# Patient Record
Sex: FEMALE | Race: BLACK OR AFRICAN AMERICAN | Employment: PART TIME | URBAN - METROPOLITAN AREA
[De-identification: names, ages, dates, MRNs, and addresses within clinical notes are randomized per-mention and may not be internally consistent; named-entity substitution may affect disease eponyms.]

---
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
## 2023-02-02 ENCOUNTER — HOSPITAL ENCOUNTER (INPATIENT)
Age: 18
LOS: 1 days | Discharge: HOME | DRG: 561 | End: 2023-02-04
Attending: STUDENT IN AN ORGANIZED HEALTH CARE EDUCATION/TRAINING PROGRAM | Admitting: OBSTETRICS & GYNECOLOGY
Payer: COMMERCIAL

## 2023-02-02 DIAGNOSIS — A41.9 SEPTICEMIA (HCC): Primary | ICD-10-CM

## 2023-02-02 PROCEDURE — 96367 TX/PROPH/DG ADDL SEQ IV INF: CPT

## 2023-02-02 PROCEDURE — 93005 ELECTROCARDIOGRAM TRACING: CPT | Performed by: STUDENT IN AN ORGANIZED HEALTH CARE EDUCATION/TRAINING PROGRAM

## 2023-02-02 PROCEDURE — 96365 THER/PROPH/DIAG IV INF INIT: CPT

## 2023-02-02 PROCEDURE — 96361 HYDRATE IV INFUSION ADD-ON: CPT

## 2023-02-02 PROCEDURE — 86900 BLOOD TYPING SEROLOGIC ABO: CPT

## 2023-02-02 PROCEDURE — 80053 COMPREHEN METABOLIC PANEL: CPT

## 2023-02-02 PROCEDURE — 36415 COLL VENOUS BLD VENIPUNCTURE: CPT

## 2023-02-02 PROCEDURE — 86850 RBC ANTIBODY SCREEN: CPT

## 2023-02-02 PROCEDURE — 99285 EMERGENCY DEPT VISIT HI MDM: CPT

## 2023-02-02 PROCEDURE — 84145 PROCALCITONIN (PCT): CPT

## 2023-02-02 PROCEDURE — 83735 ASSAY OF MAGNESIUM: CPT

## 2023-02-02 PROCEDURE — 85025 COMPLETE CBC W/AUTO DIFF WBC: CPT

## 2023-02-02 PROCEDURE — 86901 BLOOD TYPING SEROLOGIC RH(D): CPT

## 2023-02-02 PROCEDURE — 84484 ASSAY OF TROPONIN QUANT: CPT

## 2023-02-02 PROCEDURE — 2580000003 HC RX 258: Performed by: STUDENT IN AN ORGANIZED HEALTH CARE EDUCATION/TRAINING PROGRAM

## 2023-02-02 RX ADMIN — SODIUM CHLORIDE 1779 ML: 9 INJECTION, SOLUTION INTRAVENOUS at 23:54

## 2023-02-02 ASSESSMENT — PAIN DESCRIPTION - LOCATION: LOCATION: ABDOMEN

## 2023-02-02 ASSESSMENT — PAIN SCALES - GENERAL: PAINLEVEL_OUTOF10: 6

## 2023-02-02 ASSESSMENT — PAIN - FUNCTIONAL ASSESSMENT: PAIN_FUNCTIONAL_ASSESSMENT: 0-10

## 2023-02-03 ENCOUNTER — APPOINTMENT (OUTPATIENT)
Dept: CT IMAGING | Age: 18
DRG: 561 | End: 2023-02-03
Payer: COMMERCIAL

## 2023-02-03 ENCOUNTER — APPOINTMENT (OUTPATIENT)
Dept: GENERAL RADIOLOGY | Age: 18
DRG: 561 | End: 2023-02-03
Payer: COMMERCIAL

## 2023-02-03 LAB
ABO/RH: NORMAL
ALBUMIN SERPL-MCNC: 3.5 G/DL (ref 3.5–4.6)
ALP BLD-CCNC: 129 U/L (ref 40–130)
ALT SERPL-CCNC: 21 U/L (ref 0–33)
ANION GAP SERPL CALCULATED.3IONS-SCNC: 14 MEQ/L (ref 9–15)
ANISOCYTOSIS: ABNORMAL
ANTIBODY SCREEN: NORMAL
APTT: 34.7 SEC (ref 24.4–36.8)
AST SERPL-CCNC: 11 U/L (ref 0–35)
ATYPICAL LYMPHOCYTE RELATIVE PERCENT: 8 %
BACTERIA: NEGATIVE /HPF
BASOPHILS ABSOLUTE: 0 K/UL (ref 0–0.2)
BASOPHILS RELATIVE PERCENT: 0.4 %
BILIRUB SERPL-MCNC: 0.4 MG/DL (ref 0.2–0.7)
BILIRUBIN URINE: NEGATIVE
BLOOD, URINE: ABNORMAL
BUN BLDV-MCNC: 12 MG/DL (ref 6–20)
CALCIUM SERPL-MCNC: 8.8 MG/DL (ref 8.5–9.9)
CHLORIDE BLD-SCNC: 102 MEQ/L (ref 95–107)
CLARITY: CLEAR
CO2: 20 MEQ/L (ref 20–31)
COLOR: YELLOW
CREAT SERPL-MCNC: 0.77 MG/DL (ref 0.5–0.9)
EKG ATRIAL RATE: 93 BPM
EKG P AXIS: 69 DEGREES
EKG P-R INTERVAL: 132 MS
EKG Q-T INTERVAL: 350 MS
EKG QRS DURATION: 94 MS
EKG QTC CALCULATION (BAZETT): 435 MS
EKG R AXIS: 56 DEGREES
EKG T AXIS: 4 DEGREES
EKG VENTRICULAR RATE: 93 BPM
EOSINOPHILS ABSOLUTE: 0 K/UL (ref 0–0.7)
EOSINOPHILS RELATIVE PERCENT: 1.5 %
EPITHELIAL CELLS, UA: ABNORMAL /HPF (ref 0–5)
GFR SERPL CREATININE-BSD FRML MDRD: >60 ML/MIN/{1.73_M2}
GLOBULIN: 3.5 G/DL (ref 2.3–3.5)
GLUCOSE BLD-MCNC: 103 MG/DL (ref 70–99)
GLUCOSE URINE: NEGATIVE MG/DL
HCT VFR BLD CALC: 35.2 % (ref 37–47)
HEMOGLOBIN: 11.3 G/DL (ref 12–16)
HYALINE CASTS: ABNORMAL /HPF (ref 0–5)
INFLUENZA A BY PCR: NEGATIVE
INFLUENZA B BY PCR: NEGATIVE
INR BLD: 1.1
KETONES, URINE: NEGATIVE MG/DL
LACTIC ACID, SEPSIS: 1.6 MMOL/L (ref 0.5–1.9)
LEUKOCYTE ESTERASE, URINE: ABNORMAL
LYMPHOCYTES ABSOLUTE: 2.4 K/UL (ref 1–4.8)
LYMPHOCYTES RELATIVE PERCENT: 8 %
MAGNESIUM: 2 MG/DL (ref 1.7–2.2)
MCH RBC QN AUTO: 28 PG (ref 27–31.3)
MCHC RBC AUTO-ENTMCNC: 32.1 % (ref 33–37)
MCV RBC AUTO: 87.3 FL (ref 79.4–94.8)
METAMYELOCYTES RELATIVE PERCENT: 1 %
MONOCYTES ABSOLUTE: 0.6 K/UL (ref 0.2–0.8)
MONOCYTES RELATIVE PERCENT: 3.8 %
NEUTROPHILS ABSOLUTE: 12 K/UL (ref 1.4–6.5)
NEUTROPHILS RELATIVE PERCENT: 79 %
NITRITE, URINE: NEGATIVE
PDW BLD-RTO: 13.9 % (ref 11.5–14.5)
PH UA: 5.5 (ref 5–9)
PLATELET # BLD: 405 K/UL (ref 130–400)
PLATELET SLIDE REVIEW: NORMAL
POC CREATININE WHOLE BLOOD: 0.77
POTASSIUM SERPL-SCNC: 3.2 MEQ/L (ref 3.4–4.9)
PROCALCITONIN: 0.18 NG/ML (ref 0–0.15)
PROTEIN UA: NEGATIVE MG/DL
PROTHROMBIN TIME: 14.7 SEC (ref 12.3–14.9)
RBC # BLD: 4.03 M/UL (ref 4.2–5.4)
RBC UA: ABNORMAL /HPF (ref 0–2)
SARS-COV-2, NAAT: NOT DETECTED
SODIUM BLD-SCNC: 136 MEQ/L (ref 135–144)
SPECIFIC GRAVITY UA: 1.03 (ref 1–1.03)
TOTAL PROTEIN: 7 G/DL (ref 6.3–8)
TROPONIN: <0.01 NG/ML (ref 0–0.01)
URINE REFLEX TO CULTURE: YES
UROBILINOGEN, URINE: 1 E.U./DL
WBC # BLD: 15 K/UL (ref 4.5–11)
WBC UA: >100 /HPF (ref 0–5)

## 2023-02-03 PROCEDURE — 74177 CT ABD & PELVIS W/CONTRAST: CPT

## 2023-02-03 PROCEDURE — 87635 SARS-COV-2 COVID-19 AMP PRB: CPT

## 2023-02-03 PROCEDURE — 87086 URINE CULTURE/COLONY COUNT: CPT

## 2023-02-03 PROCEDURE — 36415 COLL VENOUS BLD VENIPUNCTURE: CPT

## 2023-02-03 PROCEDURE — 71045 X-RAY EXAM CHEST 1 VIEW: CPT

## 2023-02-03 PROCEDURE — 2580000003 HC RX 258: Performed by: OBSTETRICS & GYNECOLOGY

## 2023-02-03 PROCEDURE — 87502 INFLUENZA DNA AMP PROBE: CPT

## 2023-02-03 PROCEDURE — 99239 HOSP IP/OBS DSCHRG MGMT >30: CPT | Performed by: OBSTETRICS & GYNECOLOGY

## 2023-02-03 PROCEDURE — 85730 THROMBOPLASTIN TIME PARTIAL: CPT

## 2023-02-03 PROCEDURE — 87186 SC STD MICRODIL/AGAR DIL: CPT

## 2023-02-03 PROCEDURE — 87040 BLOOD CULTURE FOR BACTERIA: CPT

## 2023-02-03 PROCEDURE — 85610 PROTHROMBIN TIME: CPT

## 2023-02-03 PROCEDURE — 6360000002 HC RX W HCPCS: Performed by: OBSTETRICS & GYNECOLOGY

## 2023-02-03 PROCEDURE — 2580000003 HC RX 258: Performed by: STUDENT IN AN ORGANIZED HEALTH CARE EDUCATION/TRAINING PROGRAM

## 2023-02-03 PROCEDURE — 93010 ELECTROCARDIOGRAM REPORT: CPT | Performed by: INTERNAL MEDICINE

## 2023-02-03 PROCEDURE — 6360000002 HC RX W HCPCS: Performed by: STUDENT IN AN ORGANIZED HEALTH CARE EDUCATION/TRAINING PROGRAM

## 2023-02-03 PROCEDURE — 1210000000 HC MED SURG R&B

## 2023-02-03 PROCEDURE — 86403 PARTICLE AGGLUT ANTBDY SCRN: CPT

## 2023-02-03 PROCEDURE — 81001 URINALYSIS AUTO W/SCOPE: CPT

## 2023-02-03 PROCEDURE — 6370000000 HC RX 637 (ALT 250 FOR IP): Performed by: OBSTETRICS & GYNECOLOGY

## 2023-02-03 PROCEDURE — 2580000003 HC RX 258

## 2023-02-03 PROCEDURE — 83605 ASSAY OF LACTIC ACID: CPT

## 2023-02-03 PROCEDURE — 6360000004 HC RX CONTRAST MEDICATION

## 2023-02-03 PROCEDURE — 6370000000 HC RX 637 (ALT 250 FOR IP)

## 2023-02-03 RX ORDER — MORPHINE SULFATE 2 MG/ML
2 INJECTION, SOLUTION INTRAMUSCULAR; INTRAVENOUS
Status: DISCONTINUED | OUTPATIENT
Start: 2023-02-03 | End: 2023-02-04 | Stop reason: HOSPADM

## 2023-02-03 RX ORDER — ACETAMINOPHEN 325 MG/1
650 TABLET ORAL EVERY 4 HOURS PRN
Status: DISCONTINUED | OUTPATIENT
Start: 2023-02-03 | End: 2023-02-03

## 2023-02-03 RX ORDER — ONDANSETRON 2 MG/ML
4 INJECTION INTRAMUSCULAR; INTRAVENOUS EVERY 6 HOURS PRN
Status: DISCONTINUED | OUTPATIENT
Start: 2023-02-03 | End: 2023-02-04 | Stop reason: HOSPADM

## 2023-02-03 RX ORDER — MULTIVIT WITH MINERALS/LUTEIN
250 TABLET ORAL DAILY
Status: ON HOLD | COMMUNITY
End: 2023-02-04 | Stop reason: HOSPADM

## 2023-02-03 RX ORDER — SODIUM CHLORIDE 9 MG/ML
INJECTION, SOLUTION INTRAVENOUS
Status: COMPLETED
Start: 2023-02-03 | End: 2023-02-03

## 2023-02-03 RX ORDER — ONDANSETRON 4 MG/1
4 TABLET, ORALLY DISINTEGRATING ORAL EVERY 8 HOURS PRN
Status: DISCONTINUED | OUTPATIENT
Start: 2023-02-03 | End: 2023-02-04 | Stop reason: HOSPADM

## 2023-02-03 RX ORDER — SODIUM CHLORIDE 9 MG/ML
INJECTION, SOLUTION INTRAVENOUS PRN
Status: DISCONTINUED | OUTPATIENT
Start: 2023-02-03 | End: 2023-02-04 | Stop reason: HOSPADM

## 2023-02-03 RX ORDER — SODIUM CHLORIDE 0.9 % (FLUSH) 0.9 %
5-40 SYRINGE (ML) INJECTION PRN
Status: DISCONTINUED | OUTPATIENT
Start: 2023-02-03 | End: 2023-02-04 | Stop reason: HOSPADM

## 2023-02-03 RX ORDER — MORPHINE SULFATE 4 MG/ML
4 INJECTION, SOLUTION INTRAMUSCULAR; INTRAVENOUS
Status: DISCONTINUED | OUTPATIENT
Start: 2023-02-03 | End: 2023-02-04 | Stop reason: HOSPADM

## 2023-02-03 RX ORDER — IBUPROFEN 600 MG/1
600 TABLET ORAL ONCE
Status: COMPLETED | OUTPATIENT
Start: 2023-02-03 | End: 2023-02-03

## 2023-02-03 RX ORDER — ACETAMINOPHEN 500 MG
1000 TABLET ORAL ONCE
Status: COMPLETED | OUTPATIENT
Start: 2023-02-03 | End: 2023-02-03

## 2023-02-03 RX ORDER — PRENATAL VIT/IRON FUM/FOLIC AC 27MG-0.8MG
1 TABLET ORAL DAILY
Status: DISCONTINUED | OUTPATIENT
Start: 2023-02-03 | End: 2023-02-04 | Stop reason: HOSPADM

## 2023-02-03 RX ORDER — DOCUSATE SODIUM 100 MG/1
100 CAPSULE, LIQUID FILLED ORAL DAILY
Status: DISCONTINUED | OUTPATIENT
Start: 2023-02-03 | End: 2023-02-04 | Stop reason: HOSPADM

## 2023-02-03 RX ORDER — SODIUM CHLORIDE 0.9 % (FLUSH) 0.9 %
5-40 SYRINGE (ML) INJECTION EVERY 12 HOURS SCHEDULED
Status: DISCONTINUED | OUTPATIENT
Start: 2023-02-03 | End: 2023-02-04 | Stop reason: HOSPADM

## 2023-02-03 RX ORDER — ENOXAPARIN SODIUM 100 MG/ML
40 INJECTION SUBCUTANEOUS DAILY
Status: DISCONTINUED | OUTPATIENT
Start: 2023-02-03 | End: 2023-02-03

## 2023-02-03 RX ORDER — IBUPROFEN 400 MG/1
800 TABLET ORAL EVERY 8 HOURS PRN
Status: DISCONTINUED | OUTPATIENT
Start: 2023-02-03 | End: 2023-02-04 | Stop reason: HOSPADM

## 2023-02-03 RX ORDER — DOXYCYCLINE HYCLATE 50 MG/1
324 CAPSULE, GELATIN COATED ORAL
Status: ON HOLD | COMMUNITY
End: 2023-02-04 | Stop reason: HOSPADM

## 2023-02-03 RX ADMIN — PRENATAL VIT W/ FE FUMARATE-FA TAB 27-0.8 MG 1 TABLET: 27-0.8 TAB at 11:14

## 2023-02-03 RX ADMIN — SODIUM CHLORIDE 1779 ML: 9 INJECTION, SOLUTION INTRAVENOUS at 04:59

## 2023-02-03 RX ADMIN — ACETAMINOPHEN 1000 MG: 500 TABLET ORAL at 03:16

## 2023-02-03 RX ADMIN — SODIUM CHLORIDE, PRESERVATIVE FREE 10 ML: 5 INJECTION INTRAVENOUS at 20:54

## 2023-02-03 RX ADMIN — IOPAMIDOL 50 ML: 612 INJECTION, SOLUTION INTRAVENOUS at 02:05

## 2023-02-03 RX ADMIN — IBUPROFEN 600 MG: 600 TABLET, FILM COATED ORAL at 03:16

## 2023-02-03 RX ADMIN — SODIUM CHLORIDE 3000 MG: 900 INJECTION INTRAVENOUS at 20:52

## 2023-02-03 RX ADMIN — GENTAMICIN SULFATE 385.6 MG: 40 INJECTION, SOLUTION INTRAMUSCULAR; INTRAVENOUS at 02:46

## 2023-02-03 RX ADMIN — SODIUM CHLORIDE, PRESERVATIVE FREE 10 ML: 5 INJECTION INTRAVENOUS at 08:23

## 2023-02-03 RX ADMIN — IBUPROFEN 800 MG: 400 TABLET, FILM COATED ORAL at 14:09

## 2023-02-03 RX ADMIN — IBUPROFEN 800 MG: 400 TABLET, FILM COATED ORAL at 23:07

## 2023-02-03 RX ADMIN — SODIUM CHLORIDE 3000 MG: 900 INJECTION INTRAVENOUS at 08:22

## 2023-02-03 RX ADMIN — SODIUM CHLORIDE 3000 MG: 900 INJECTION INTRAVENOUS at 14:04

## 2023-02-03 RX ADMIN — DOCUSATE SODIUM 100 MG: 100 CAPSULE, LIQUID FILLED ORAL at 11:14

## 2023-02-03 RX ADMIN — SODIUM CHLORIDE 3000 MG: 900 INJECTION INTRAVENOUS at 01:27

## 2023-02-03 ASSESSMENT — PAIN DESCRIPTION - LOCATION
LOCATION: ABDOMEN
LOCATION: HEAD;FLANK

## 2023-02-03 ASSESSMENT — ENCOUNTER SYMPTOMS
COUGH: 0
VOMITING: 0
SHORTNESS OF BREATH: 0
PHOTOPHOBIA: 0
BLOOD IN STOOL: 0
NAUSEA: 1
ABDOMINAL PAIN: 1
CONSTIPATION: 0
WHEEZING: 0
DIARRHEA: 0

## 2023-02-03 ASSESSMENT — PAIN - FUNCTIONAL ASSESSMENT: PAIN_FUNCTIONAL_ASSESSMENT: ACTIVITIES ARE NOT PREVENTED

## 2023-02-03 ASSESSMENT — PAIN DESCRIPTION - DESCRIPTORS: DESCRIPTORS: SHARP

## 2023-02-03 ASSESSMENT — PAIN SCALES - GENERAL
PAINLEVEL_OUTOF10: 5
PAINLEVEL_OUTOF10: 3
PAINLEVEL_OUTOF10: 0

## 2023-02-03 ASSESSMENT — PAIN DESCRIPTION - ORIENTATION
ORIENTATION: LEFT;LOWER
ORIENTATION: LEFT

## 2023-02-03 NOTE — PROGRESS NOTES
Pt arrived to unit via W/C from ED accompanied by mother at 5. Alert and oriented on admit. C/o minimal pain in ABD and head. Skin intact except for  incision.   Pt can not remember what medications she takes daily - mother to bring in list.

## 2023-02-03 NOTE — PROGRESS NOTES
Pharmacy Note  Gentamicin Consult    Jus Ch is a 21 y.o. female started on Gentamicin for Endometriosis; consult received from Dr. Rian Harris to manage therapy. Also receiving the following antibiotics: Unasyn. Septicemia ([address] [A41.9]  Endometritis following delivery [O86.12]  Allergies: Tylenol [acetaminophen]    Temp max: 98.6 *F    Cultures  Recent Labs     02/03/23  0013   COVID19 Not Detected     Height: 5' 6\" (167.6 cm), Weight - Scale: 170 lb (77.1 kg), Body mass index is 27.44 kg/m². Recent Labs     02/02/23  2345   CREATININE 0.77   Estimated Creatinine Clearance: 123 mL/min (based on SCr of 0.77 mg/dL). .    Goal Trough Level: <1  Goal Peak Level: not necessary with 5 mg/kg dosing    Assessment/Plan:  Will initiate Gentamicin 385.5 mg (5 mg/kg per TBW 77.1 kg) IV every 24 hours. LOT 7 days. Trough level ordered for 2/3/23 at 2300, 4 hours prior to next dose due 2/4/23 at 0300. Timing of future trough levels will be determined based on culture results, renal function, and clinical response. Thank you for the consult. Will continue to follow.   Ventura Richards PharmD formerly [facility] 2/3/2023 5:35 AM

## 2023-02-03 NOTE — ED PROVIDER NOTES
[facility] ED  eMERGENCY dEPARTMENT eNCOUnter      Pt Name: Desmond Krueger  MRN: [id number]  [place] 2005  Date of evaluation: 2023  Provider: EVELIN Kelly        HISTORY OF PRESENT ILLNESS    Desmond Krueger is a 21 y.o. female per chart review has ah/o s/p   complicated by acute blood loss anemia. Patient presents to the emergency department for 2-day history of nausea, dizziness, lightheadedness, hot flashes, chills, left lower quadrant abdominal pain. Patient states that she felt well leaving the hospital after her  and the symptoms are new. She presents today as she had a presyncopal event in the bathroom in which she felt lightheaded and dizzy on toilet, lowered her self onto her bottom on the floor, did not lose consciousness but felt as if she was about to. Did not fall. Patient reports hot flashes no noted temperatures taken. Reports she has been having scant light vaginal bleeding but today she had an episode that was a little bit darker. No active hemorrhage. Reports thin watery vaginal discharge that she feels may have a foul smell. Denies dysuria, urinary urgency, urinary frequency, decreased urination, hematuria. Reports left lower quadrant abdominal pain. Denies active discharge from her  incision, Steri-Strips are still in place. No constipation or obstipation. No emesis. States she had a headache earlier today that is now resolved, she did have an epidural, denies that she has been having headaches since discharge. Denies difficulty with positional changes in regards to headache. Denies photophobia. Denies neck pain. Denies neck stiffness. REVIEW OF SYSTEMS       Review of Systems   Constitutional:  Positive for chills and fever. HENT:  Negative for congestion. Eyes:  Negative for photophobia and visual disturbance. Respiratory:  Negative for cough, shortness of breath and wheezing.     Cardiovascular: Negative for chest pain. Gastrointestinal:  Positive for abdominal pain and nausea. Negative for blood in stool, constipation, diarrhea and vomiting. Genitourinary:  Positive for vaginal bleeding and vaginal discharge. Negative for decreased urine volume, difficulty urinating, dysuria, flank pain, frequency, hematuria, pelvic pain and urgency. Musculoskeletal:  Negative for myalgias, neck pain and neck stiffness. Skin:  Positive for wound (surgical incision). Neurological:  Positive for dizziness and light-headedness. Negative for seizures, weakness and headaches. Psychiatric/Behavioral:  Negative for confusion. Except as noted above the remainder of the review of systems was reviewed and negative. PAST MEDICAL HISTORY   No past medical history on file. SURGICAL HISTORY       Past Surgical History:   Procedure Laterality Date    FOOT SURGERY      FOOT SURGERY      2021 cyst removal         CURRENT MEDICATIONS       Previous Medications    ACETAMINOPHEN (TYLENOL) 500 MG TABLET    Take 2 tablets by mouth every 6 hours as needed for Pain or Fever    ALAYCEN 1/35 1-35 MG-MCG PER TABLET    take 1 tablet by mouth once daily    CEPHALEXIN (KEFLEX) 500 MG CAPSULE    Take 1 capsule by mouth 2 times daily    HYDROCORTISONE (ANUSOL-HC) 25 MG SUPPOSITORY    Place 1 suppository rectally 2 times daily    IBUPROFEN (IBU) 400 MG TABLET    Take 1 tablet by mouth every 6 hours as needed for Pain    LIDOCAINE (LIDODERM) 5 %    Place 1 patch onto the skin daily 12 hours on, 12 hours off.     NAPROXEN (NAPROSYN) 375 MG TABLET    Take 375 mg by mouth as needed    NORETHINDRONE-ETHINYL ESTRADIOL (LOESTRIN 1/20, 21,) 1-20 MG-MCG PER TABLET    Take 1 tablet by mouth daily    NORETHINDRONE-ETHINYL ESTRADIOL-FE (LO LOESTRIN FE) 1 MG-10 MCG / 10 MCG TABLET    Take 1 tablet by mouth daily [employer] PATIENT SAVINGS PROGRAM  -  BIN#: [id number]; PCN#:  [id number];  GRP#: [id number];  ID#:  [id number]    ONDANSETRON (ZOFRAN ODT) 4 MG DISINTEGRATING TABLET    Take 1 tablet by mouth every 8 hours as needed for Nausea    PRENATAL VIT [id number] (PNV PO)    Take 1 tablet by mouth daily       ALLERGIES     Tylenol [acetaminophen]    FAMILY HISTORY     No family history on file. SOCIAL HISTORY       Social History     Socioeconomic History    Marital status: Single   Tobacco Use    Smoking status: Never    Smokeless tobacco: Never   Substance and Sexual Activity    Alcohol use: Never    Drug use: Never    Sexual activity: Never         PHYSICAL EXAM        ED Triage Vitals [02/02/23 2332]   BP Temp Temp Source Heart Rate Resp SpO2 Height Weight - Scale   (!) 88/52 98.6 °F (37 °C) Oral (!) 112 20 99 % 5' 6\" (1.676 m) 170 lb (77.1 kg)       Physical Exam  Constitutional:       General: She is not in acute distress. Appearance: Normal appearance. She is ill-appearing. HENT:      Head: Normocephalic and atraumatic. Right Ear: External ear normal.      Left Ear: External ear normal.      Nose: Nose normal.      Mouth/Throat:      Mouth: Mucous membranes are moist.      Pharynx: Oropharynx is clear. Eyes:      Extraocular Movements: Extraocular movements intact. Conjunctiva/sclera: Conjunctivae normal.      Pupils: Pupils are equal, round, and reactive to light. Neck:      Comments: No meningismus  Cardiovascular:      Rate and Rhythm: Normal rate and regular rhythm. Pulmonary:      Effort: Pulmonary effort is normal. No respiratory distress. Breath sounds: Normal breath sounds. Abdominal:      General: Bowel sounds are normal. There is no distension. Palpations: Abdomen is soft. There is no mass. Tenderness: There is abdominal tenderness in the left lower quadrant. There is guarding. There is no right CVA tenderness or left CVA tenderness. Hernia: No hernia is present. Comments:  Well healing surgical incision with Steri-Strips in place, no evidence of purulent discharge, bleeding, no surrounding skin changes, no fluctuant masses. Left lower quadrant and left pelvic tenderness is present. Musculoskeletal:         General: No swelling. Normal range of motion. Cervical back: Normal range of motion. No rigidity. Right lower leg: No edema. Left lower leg: No edema. Skin:     General: Skin is warm. Capillary Refill: Capillary refill takes less than 2 seconds. Neurological:      General: No focal deficit present. Mental Status: She is alert and oriented to person, place, and time.    Psychiatric:         Mood and Affect: Mood normal.         Behavior: Behavior normal.         LABS:  Labs Reviewed   CBC WITH AUTO DIFFERENTIAL - Abnormal; Notable for the following components:       Result Value    WBC 15.0 (*)     RBC 4.03 (*)     Hemoglobin 11.3 (*)     Hematocrit 35.2 (*)     MCHC 32.1 (*)     Platelets 325 (*)     Neutrophils Absolute 12.0 (*)     All other components within normal limits   PROCALCITONIN - Abnormal; Notable for the following components:    Procalcitonin 0.18 (*)     All other components within normal limits   COMPREHENSIVE METABOLIC PANEL - Abnormal; Notable for the following components:    Potassium 3.2 (*)     Glucose 103 (*)     All other components within normal limits   URINALYSIS WITH REFLEX TO CULTURE - Abnormal; Notable for the following components:    Blood, Urine LARGE (*)     Leukocyte Esterase, Urine MODERATE (*)     All other components within normal limits   POCT CREATININE - Normal   RAPID INFLUENZA A/B ANTIGENS   COVID-19, RAPID   CULTURE, BLOOD 1   CULTURE, BLOOD 2   LACTATE, SEPSIS   TROPONIN   MAGNESIUM   PROTIME-INR   APTT   LACTATE, SEPSIS   URINALYSIS WITH REFLEX TO CULTURE   MICROSCOPIC URINALYSIS   TYPE AND SCREEN     EKG NSR HR 93 QT/Qtc 350/435 inverted T waves V1 V3 V4 V5 no ectopy    MDM:   Vitals:    Vitals:    02/03/23 0002 02/03/23 0102 02/03/23 0403 02/03/23 0431   BP: 110/64 116/73 123/83 105/73   Pulse: 88 81 58 60 Resp: 17 17 14 16   Temp:       TempSrc:       SpO2: 100% 100% 100% 99%   Weight:       Height:           75-year-old  female patient 9 days status post  section presents to the ED for new onset nausea, lightheadedness, dizziness, hot flashes, chills, LLQ abdominal pain. Also reporting some darker vaginal bleeding and potentially foul-smelling vaginal discharge. Patient presents afebrile. VSS. She had postoperative acute blood loss anemia that stabilized but denies other postoperative complaints. She delivered at 38 weeks 1 day with scheduled  section. Patient is also evaluated the ED by attending Dr. Bernadine Mccoy. There is concern for acute postoperative infection. On arrival sepsis work-up was initiated. 30 cc/kg fluid bolus given. Empiric antibiotic coverage with IV gentamicin and IV Unasyn. Patient has focal left lower quadrant tenderness with guarding on examination. CT abdomen pelvis is performed demonstrating a gravid uterus with evidence of recent postoperative changes, small amount of pelvic free fluid that could represent a small amount of hemoperitoneum or postoperative seroma, no organized fluid collections or bowel perforation. Work-up remarkable for leukocytosis 15.0. Lactic acid 1.6.  0.18 procalcitonin. Urinalysis pending at this time. Hemoglobin stable from prior, 11.3. ED attending Dr. Bernadine Mccoy spoke with OB/GYN Dr. Colon Bring accepting admission. Concern for endometritis with sepsis not ruled out. CRITICAL CARE TIME   Total CriticalCare time was 0 minutes, excluding separately reportable procedures. There was a high probability of clinically significant/life threatening deterioration in the patient's condition which required my urgent intervention. PROCEDURES:  Unlessotherwise noted below, none      Procedures      FINAL IMPRESSION      1.  Septicemia ([address]    2. Endometritis following delivery          DISPOSITION/PLAN   DISPOSITION Admitted 02/03/2023 03:24:20 AM          EVELIN Correa (electronically signed)  Attending Emergency Physician          Jovon Correama  02/03/23 8253

## 2023-02-03 NOTE — H&P
Department of Obstetrics/Gynecology            CHIEF COMPLAINT:  N/V, and Left lower quadrant pain    History obtained from Patient    HISTORY OF PRESENT ILLNESS:         Patient is a 20 yo  female, s/p Primary LTCS 2023 presents to ER with 2 day history of nausea, dizziness, and left lower quadrant pain. She admits to not having much of an appetite bu has been trying to keep up with fluids as she is breast feeding. She presented with concerns of dizziness last night. Evaluation in ER found her to be afebrile but tachycardic. Labs showed high white count. She was admitted for IV fluids, IV antibiotics for endometritis. She is breast feeding. She was induced for IUGR with mild Fetal Pyelectasis at 38 weeks. She had failed induction secondary to persistent Category 2 fetal tracing. She had primary LTCS @ 38.1 weeks on 2023 by Dr. Frederick Franks DO at [facility]. She delivered viable female infant, weighing 5 lbs, 13 oz (2639 grams) with apgars 1/8. CD was complicated by PPH >1205 ml secondary to uterine atony that responded to uterotonics. Postpartum and postop course remained uncomplicated. Past Medical History:    History reviewed. No pertinent past medical history. Past Surgical History:        Procedure Laterality Date     SECTION      FOOT SURGERY      FOOT SURGERY       cyst removal       Past Gynecological History:  Last menstrual period:  2023      Obstetrical  History:     G1 -SAB  G2- S/p Primary LTCS @ 38.1  weeks, 2023 [facility] , failed induction (IUGR), DENISE, Female, 5 lbs, 13 oz    Medications Prior to Admission:   Medications Prior to Admission: Ascorbic Acid (VITAMIN C) 250 MG tablet, Take 250 mg by mouth daily  ferrous gluconate (FERGON) 324 (38 Fe) MG tablet, Take 324 mg by mouth daily (with breakfast)  lidocaine (LIDODERM) 5 %, Place 1 patch onto the skin daily 12 hours on, 12 hours off.   Prenatal Vit w/Nz-Nwueekjdi-LU (PNV PO), Take 1 tablet by mouth daily  acetaminophen (TYLENOL) 500 MG tablet, Take 2 tablets by mouth every 6 hours as needed for Pain or Fever (Patient not taking: No sig reported)  ibuprofen (IBU) 400 MG tablet, Take 1 tablet by mouth every 6 hours as needed for Pain (Patient not taking: No sig reported)  cephALEXin (KEFLEX) 500 MG capsule, Take 1 capsule by mouth 2 times daily (Patient not taking: No sig reported)  ondansetron (ZOFRAN ODT) 4 MG disintegrating tablet, Take 1 tablet by mouth every 8 hours as needed for Nausea (Patient not taking: No sig reported)  ALAYCEN 1/35 1-35 MG-MCG per tablet, take 1 tablet by mouth once daily (Patient not taking: No sig reported)  hydrocortisone (ANUSOL-HC) 25 MG suppository, Place 1 suppository rectally 2 times daily (Patient not taking: No sig reported)  norethindrone-ethinyl estradiol (LOESTRIN 1/20, 21,) 1-20 MG-MCG per tablet, Take 1 tablet by mouth daily (Patient not taking: No sig reported)  norethindrone-ethinyl estradiol-Fe (LO LOESTRIN FE) 1 MG-10 MCG / 10 MCG tablet, Take 1 tablet by mouth daily [employer] PATIENT SAVINGS PROGRAM  -  BIN#: [id number]; PCN#:  AMERICO;  GRP#: [id number];  ID#:  [id number] (Patient not taking: No sig reported)  naproxen (NAPROSYN) 375 MG tablet, Take 375 mg by mouth as needed (Patient not taking: No sig reported)    Allergies:  Tylenol [acetaminophen]     Social History:  Smoking Status: Never          Smokeless Tobacco Status: Never         Alcohol Use: Never         Drug Use: Never         Sexual Activity: Yes         Family History:   History reviewed. No pertinent family history. REVIEW OF SYSTEMS:        ROS:  Gen: Negative  CV: Negative  Lungs: Negative  Abdomen: LLQ pain  Pelvis: Negative  Rest of systems reviewed and found to be negative.      PHYSICAL EXAM:    Vitals:  BP (!) 108/58   Pulse 51   Temp 97.7 °F (36.5 °C) (Oral)   Resp 16   Ht 5' 6\" (1.676 m)   Wt 179 lb 9.6 oz (81.5 kg)   LMP  (LMP Unknown)   SpO2 100%   Breastfeeding Yes   BMI 28.99 kg/m²     PHYSICAL EXAM:     General appearance:  awake, alert, cooperative, no apparent distress, and appears stated age  Neurologic:  Awake, alert, oriented to name, place and time. Cranial nerves II-XII are grossly intact. Motor is 5 out of 5 bilaterally. Sensory is intact, and gait is normal.  Lungs:  No increased work of breathing, good air exchange, clear to auscultation bilaterally, no crackles or wheezing  Heart:  Normal apical impulse, regular rate and rhythm, normal S1 and S2, no S3 or S4, and no murmur noted  Abdomen:  No scars, normal bowel sounds, soft, non-distended, mildly tender   Incision site: C/D/I with steri strips in place        DATA:  Results     Component Value Units   EKG 12 Lead [[id number]]    Collected: 02/02/23 2343    Updated: 02/03/23 0726     Ventricular Rate 93 BPM    Atrial Rate 93 BPM    P-R Interval 132 ms    QRS Duration 94 ms    Q-T Interval 350 ms    QTc Calculation (Bazett) 435 ms    P Axis 69 degrees    R Axis 56 degrees    T Axis 4 degrees   Narrative:     Normal sinus rhythm   T wave abnormality, consider anterior ischemia   Abnormal ECG   No previous ECGs available   CT ABDOMEN PELVIS W IV CONTRAST Additional Contrast? None [[id number]]    Collected: 02/03/23 5851    Updated: 02/03/23 0640    Narrative:     EXAMINATION:   CT OF THE ABDOMEN AND PELVIS WITH CONTRAST 2/3/2023 2:05 am     TECHNIQUE:   CT of the abdomen and pelvis was performed with the administration of   intravenous contrast. Multiplanar reformatted images are provided for review. Automated exposure control, iterative reconstruction, and/or weight based   adjustment of the mA/kV was utilized to reduce the radiation dose to as low   as reasonably achievable. COMPARISON:   None.      HISTORY:   ORDERING SYSTEM PROVIDED HISTORY: LLQ pain nausea fever/chills s/p C/section,   r/o postoperative infection   TECHNOLOGIST PROVIDED HISTORY:   Reason for exam:->LLQ pain nausea fever/chills s/p C/section, r/o postoperative infection   Additional Contrast?->None   Decision Support Exception - unselect if not a suspected or confirmed   emergency medical condition->Emergency Medical Condition (MA)   What reading provider will be dictating this exam?->CRC     FINDINGS:   Lower Chest: Atelectatic changes seen lung bases bilaterally. Organs: The liver is homogeneous. No stones in the gallbladder. The   pancreas is unremarkable in appearance. The spleen is within normal limits. Both adrenal glands are within normal limits. Symmetric enhancement of the   renal parenchyma. Mild distension seen in the renal collecting systems   bilaterally likely related to the previous pregnancy. GI/Bowel: The stomach is unremarkable. No wall thickening. The small bowel   is mildly dilated with some nonspecific fluid-filled small bowel loops in   which a nonspecific enteritis cannot be excluded. No significant wall   thickening. There is stool seen scattered diffusely throughout the colon. No signs of obvious obstruction or obstructing lesion. Pelvis: There is a postpartum uterus. Head there is low-attenuation seen   within the endometrial canal with no evidence of air. There is some   irregularity identified of the endometrial wall. Consider ultrasound to   evaluate for retained products of conception. No signs of endometrioid is. Peritoneum/Retroperitoneum: There is a small amount of intermediate density   pelvic fluid free fluid identified and a scattered foci of intraperitoneal as   well as extraperitoneal gas superficial to the urinary bladder and the right   iliac fossa and ventral abdominal wall. Findings are all likely   postoperative. No organized fluid collection identified. Bones/Soft Tissues: Bony structures reveal no evidence of aggressive or acute   osseous abnormality. Postsurgical changes seen from  within the   subcutaneous tissues.     Impression:     Gravid uterus with evidence of recent postoperative changes compatible with   . Small amount of pelvic free fluid that could reveals represent a   small amount of hemoperitoneum or postoperative seroma with no organized   fluid collection to suggest evidence of an abscess. There is no evidence of   air in the endometrial cavity to suggest endometritis.   Consider ultrasound   for retained products of conception if clinical symptoms exist.    Culture, Urine [[id number]]    Collected: 23 0603    Updated: 23 0604    Urinalysis with Reflex to Culture [[id number]] (Abnormal)    Collected: 23 0230    Updated: 23 06    Specimen Source: Urine     Color, UA Yellow    Clarity, UA Clear    Glucose, Ur Negative mg/dL    Bilirubin Urine Negative    Ketones, Urine Negative mg/dL    Specific Gravity, UA 1.028    Blood, Urine LARGE Abnormal     pH, UA 5.5    Protein, UA Negative mg/dL    Urobilinogen, Urine 1.0 E.U./dL    Nitrite, Urine Negative    Leukocyte Esterase, Urine MODERATE Abnormal     Urine Reflex to Culture Yes   Microscopic Urinalysis [[id number]] (Abnormal)    Collected: 23 0230    Updated: 23 0602     RBC, UA 10-20 Abnormal  /HPF    Bacteria, UA Negative /HPF    Hyaline Casts, UA 0-1 /HPF    WBC, UA >100 High  /HPF    Epithelial Cells, UA 0-2 /HPF   CBC with Auto Differential [[id number]] (Abnormal)    Collected: 23 2345    Updated: 23 0437    Specimen Source: Blood     WBC 15.0 High  K/uL    RBC 4.03 Low  M/uL    Hemoglobin 11.3 Low  g/dL    Hematocrit 35.2 Low  %    MCV 87.3 fL    MCH 28.0 pg    MCHC 32.1 Low  %    RDW 13.9 %    Platelets 289 High  K/uL    PLATELET SLIDE REVIEW Normal    Neutrophils % 79.0 %    Lymphocytes % 8.0 %    Monocytes % 3.8 %    Eosinophils % 1.5 %    Basophils % 0.4 %    Neutrophils Absolute 12.0 High  K/uL    Lymphocytes Absolute 2.4 K/uL    Monocytes Absolute 0.6 K/uL    Eosinophils Absolute 0.0 K/uL    Basophils Absolute 0.0 K/uL    Atypical Lymphocytes Relative 8 %    Metamyelocytes Relative 1 %    Anisocytosis 1+   Procalcitonin [[id number]] (Abnormal)    Collected: 02/02/23 2345    Updated: 02/03/23 0316    Specimen Type: Blood     Procalcitonin 0.18 High  ng/mL    Comment: Suspected Sepsis:   Low likelihood of sepsis  <.50 ng/mL     Increased likelihood of sepsis 0.50-2.00 ng/mL   Antibiotics encouraged     High risk of sepsis/shock   >2.00 ng/mL   Antibiotics strongly encouraged     Suspected Lower Respiratory Tract Infections:   Low likelihood of bacterial infection  <0.24 ng/mL     Increased likelihood of bacterial infection >0.24 ng/mL   Antibiotics encouraged     With successful antibiotic therapy, PCT levels should decrease   rapidly. (Half-life of 24 to 36 hours.)     Procalcitonin values from samples collected within the first   6 hours of systemic infection may still be low. Retesting may be indicated. Values from day 1 and day 4 can be entered into the Change in   Procalcitonin Calculator to determine the patient's   Mortality Risk Prognosis   (www.[facility]-pct-calculator. [employer])     In healthy neonates, plasma Procalcitonin (PCT) concentrations   increase gradually after birth, reaching peak values at about   24 hours of age then decrease to normal values below 0.5 ng/mL   by 48-72 hours of age. Culture, Blood 2 [[id number]]    Collected: 02/03/23 0141    Updated: 02/03/23 0141    Specimen Source: Blood    TYPE AND SCREEN [[id number]]    Collected: 02/02/23 2345    Updated: 02/03/23 0122    Specimen Source: Blood     ABO/Rh B POS    Antibody Screen NEG   POCT CREATININE [[id number]] (Normal)    Resulted: 02/03/23 0115    Updated: 02/03/23 0115    Specimen Source: Blood     POC CREATININE WHOLE BLOOD 0.77   XR CHEST PORTABLE [[id number]]    Collected: 02/03/23 0040    Updated: 02/03/23 0043    Narrative:     EXAMINATION:   ONE XRAY VIEW OF THE CHEST     2/3/2023 12:32 am     COMPARISON:   None.      HISTORY:   ORDERING SYSTEM PROVIDED HISTORY: Sepsis   TECHNOLOGIST PROVIDED HISTORY:   Reason for exam:->Sepsis   Is the patient pregnant?->No   What reading provider will be dictating this exam?->CRC     FINDINGS:   Normal cardiomediastinal silhouette. Lungs clear. No pneumothorax or   effusion. Osseous thorax intact. Impression:     No acute disease. RECOMMENDATION:   Careful clinical correlation and follow up recommended. COVID-19, Rapid [[id number]]    Collected: 02/03/23 0013    Updated: 02/03/23 0039    Specimen Source: Nasopharyngeal Swab     SARS-CoV-2, NAAT Not Detected    Comment: Rapid NAAT:   Negative results should be treated as presumptive and,   if inconsistent with clinical signs and symptoms or necessary for   patient management, should be tested with an alternative molecular   assay. Negative results do not preclude SARS-CoV-2 infection and   should not be used as the sole basis for patient management decisions. This test has been authorized by the FDA under an Emergency Use   Authorization (EUA) for use by authorized laboratories. Fact sheet for Healthcare Providers:   http://www.emil.osito/   Fact sheet for Patients: http://www.anette-shira.osito/     METHODOLOGY: Isothermal Nucleic Acid Amplification       Rapid influenza A/B antigens [[id number]]    Collected: 02/03/23 0013    Updated: 02/03/23 0039    Specimen Source: Nasopharyngeal     Influenza A by PCR Negative    Influenza B by PCR Negative   Comprehensive Metabolic Panel [[id number]] (Abnormal)    Collected: 02/02/23 2345    Updated: 02/03/23 0039    Specimen Source: Blood     Sodium 136 mEq/L    Potassium 3.2 Low  mEq/L    Chloride 102 mEq/L    CO2 20 mEq/L    Anion Gap 14 mEq/L    Glucose 103 High  mg/dL    BUN 12 mg/dL    Creatinine 0.77 mg/dL    Est, Glom Filt Rate >60.0    Comment: Pediatric calculator link   [place].[email or web address]. org/professionals/kdoqi/gfr_calculatorped   Effective Oct 3, 2022   These results are not intended for use in patients   <25years of age. eGFR results are calculated without   a race factor using the 2021 CKD-EPI equation. Careful   clinical correlation is recommended, particularly when   comparing to results calculated using previous equations. The CKD-EPI equation is less accurate in patients with   extremes of muscle mass, extra-renal metabolism of   creatinine, excessive creatinine ingestion, or following   therapy that affects renal tubular secretion. Calcium 8.8 mg/dL    Total Protein 7.0 g/dL    Albumin 3.5 g/dL    Total Bilirubin 0.4 mg/dL    Alkaline Phosphatase 129 U/L    ALT 21 U/L    AST 11 U/L    Globulin 3.5 g/dL   Magnesium [[id number]]    Collected: 02/02/23 2345    Updated: 02/03/23 0039    Specimen Source: Blood     Magnesium 2.0 mg/dL   Troponin [[id number]]    Collected: 02/02/23 2345    Updated: 02/03/23 0037    Specimen Source: Blood     Troponin <0.010 ng/mL    Comment: Methodology by Troponin T.      APTT [[id number]]    Collected: 02/03/23 0011    Updated: 02/03/23 0033    Specimen Source: Blood     aPTT 34.7 sec    Comment: Effective 11/4/2020:   Heparin Therapeutic Range: 64.0 - 98.0 seconds. Protime-INR [[id number]]    Collected: 02/03/23 0011    Updated: 02/03/23 0032    Specimen Source: Blood     Protime 14.7 sec    INR 1.1   Lactate, Sepsis [[id number]]    Collected: 02/03/23 0011    Updated: 02/03/23 0031    Specimen Source: Blood     Lactic Acid, Sepsis 1.6 mmol/L   Blood Culture 1 [[id number]]    Collected: 02/03/23 0011    Updated: 02/03/23 0014    Specimen Source: Blood        IMPRESSION/RECOMMENDATIONS:      Principal Problem:    Endometritis following delivery  Plan:   Continue IV Antibiotics for 24 hours. Repeat CBC in am  Anticipate discharge home in am to follow up with private OBGYN provider. Patient may breast feed with antibiotics.

## 2023-02-03 NOTE — CARE COORDINATION
[facility] AT [place] Case Management Initial Discharge Assessment    If patient is discharged prior to next notation, then this note serves as note for discharge by case management. Met with Patient to discuss discharge plan. PCP: Yaritza Jenkins MD                                Date of Last Visit:     VA Patient: No        VA Notified: no    If no PCP, list provided? N/A    Discharge Planning    Living Arrangements: independently at home    Who do you live with? Mother and baby    Who helps you with your care:  self    If lives at home:     Do you have any barriers navigating in your home? no    Patient can perform ADL? Yes    Current Services (outpatient and in home) :  None    Dialysis: No    Is transportation available to get to your appointments? Yes    DME Equipment:  no    Respiratory equipment: None    Respiratory provider:  no     Pharmacy:  yes - [facility] on [address]    Consult with Medication Assistance Program?  No      [facility]? Declined    Patient agreeable to SNF/Rehab? Declined    Other discharge needs identified? N/A    Does Patient Have a High-Risk for Readmission Diagnosis (CHF, PN, MI, COPD)? No      Initial Discharge Plan? (Note: please see concurrent daily documentation for any updates after initial note). Met with pt at bedside to discuss discharge planning. Pt plans to return home with mother and her two week old baby. Pt denies needs.      Readmission Risk              Risk of Unplanned Readmission:  7         Electronically signed by Raegan Bolton RN on 2/3/2023 at 3:01 PM

## 2023-02-03 NOTE — ED NOTES
Patient report improvement from the time of arrival  Patient able to ambulate to the BR with steady gait  No S/S of distress noted at this time     Varinder Reyez RN  02/03/23 7808

## 2023-02-03 NOTE — ED TRIAGE NOTES
Pt to ED due to dizziness upon standing, chills, and generally not feeling well. Pt states she had a C section around 1 week ago. Pt was brought back to triage in a wheelchair due to dizziness. Pt is alert and oriented x4. Skin is warm, dry, intact.  Resp are regular and equal.

## 2023-02-03 NOTE — PROGRESS NOTES
Assessment completed this shift. Alert and oriented x4. Up independently in room. Lungs clear and BS active. Denies pain or nausea at time of assessment. Steri strips Clean, dry, and intact to lower abdominal incision. History of C section 1/25/23. Patient receiving Unasyn and Gentamycin. Per Dr Javon Amador, breast milk ok to give to infant.    Electronically signed by Marisol Lozada RN on 2/3/2023 at 10:32 AM

## 2023-02-03 NOTE — PLAN OF CARE
Problem: Discharge Planning  Goal: Discharge to home or other facility with appropriate resources  Outcome: Progressing  Flowsheets (Taken 2/3/2023 3927)  Discharge to home or other facility with appropriate resources:   Identify barriers to discharge with patient and caregiver   Identify discharge learning needs (meds, wound care, etc)   Refer to discharge planning if patient needs post-hospital services based on physician order or complex needs related to functional status, cognitive ability or social support system     Problem: Pain  Goal: Verbalizes/displays adequate comfort level or baseline comfort level  Outcome: Progressing     Problem: Safety - Adult  Goal: Free from fall injury  Outcome: Progressing

## 2023-02-04 VITALS
DIASTOLIC BLOOD PRESSURE: 70 MMHG | TEMPERATURE: 98.2 F | BODY MASS INDEX: 28.87 KG/M2 | RESPIRATION RATE: 18 BRPM | HEART RATE: 57 BPM | SYSTOLIC BLOOD PRESSURE: 115 MMHG | WEIGHT: 179.6 LBS | OXYGEN SATURATION: 100 % | HEIGHT: 66 IN

## 2023-02-04 LAB
BASOPHILS ABSOLUTE: 0 K/UL (ref 0–0.2)
BASOPHILS RELATIVE PERCENT: 0.4 %
BLOOD CULTURE, ROUTINE: NORMAL
CULTURE, BLOOD 2: NORMAL
EOSINOPHILS ABSOLUTE: 0.4 K/UL (ref 0–0.7)
EOSINOPHILS RELATIVE PERCENT: 3.7 %
GENT TROUGH DATE LAST DOSE: NORMAL
GENT TROUGH DOSE AMOUNT: NORMAL
GENT TROUGH DOSE TIME: NORMAL
GENTAMICIN TROUGH: <0.3 UG/ML
HCT VFR BLD CALC: 28.8 % (ref 37–47)
HEMOGLOBIN: 9.7 G/DL (ref 12–16)
LYMPHOCYTES ABSOLUTE: 1.6 K/UL (ref 1–4.8)
LYMPHOCYTES RELATIVE PERCENT: 14 %
MCH RBC QN AUTO: 28.8 PG (ref 27–31.3)
MCHC RBC AUTO-ENTMCNC: 33.6 % (ref 33–37)
MCV RBC AUTO: 85.7 FL (ref 79.4–94.8)
MONOCYTES ABSOLUTE: 1.2 K/UL (ref 0.2–0.8)
MONOCYTES RELATIVE PERCENT: 10.1 %
NEUTROPHILS ABSOLUTE: 8.2 K/UL (ref 1.4–6.5)
NEUTROPHILS RELATIVE PERCENT: 71.8 %
PDW BLD-RTO: 13.9 % (ref 11.5–14.5)
PLATELET # BLD: 350 K/UL (ref 130–400)
RBC # BLD: 3.37 M/UL (ref 4.2–5.4)
WBC # BLD: 11.5 K/UL (ref 4.5–11)

## 2023-02-04 PROCEDURE — 6360000002 HC RX W HCPCS: Performed by: OBSTETRICS & GYNECOLOGY

## 2023-02-04 PROCEDURE — 2580000003 HC RX 258: Performed by: OBSTETRICS & GYNECOLOGY

## 2023-02-04 PROCEDURE — 80170 ASSAY OF GENTAMICIN: CPT

## 2023-02-04 PROCEDURE — 36415 COLL VENOUS BLD VENIPUNCTURE: CPT

## 2023-02-04 PROCEDURE — 85025 COMPLETE CBC W/AUTO DIFF WBC: CPT

## 2023-02-04 PROCEDURE — 6370000000 HC RX 637 (ALT 250 FOR IP): Performed by: OBSTETRICS & GYNECOLOGY

## 2023-02-04 RX ORDER — LANOLIN ALCOHOL/MO/W.PET/CERES
325 CREAM (GRAM) TOPICAL 2 TIMES DAILY
Qty: 90 TABLET | Refills: 3 | Status: SHIPPED | OUTPATIENT
Start: 2023-02-04

## 2023-02-04 RX ORDER — CEPHALEXIN 500 MG/1
500 CAPSULE ORAL 4 TIMES DAILY
Qty: 28 CAPSULE | Refills: 0 | Status: SHIPPED | OUTPATIENT
Start: 2023-02-04

## 2023-02-04 RX ADMIN — DOCUSATE SODIUM 100 MG: 100 CAPSULE, LIQUID FILLED ORAL at 09:43

## 2023-02-04 RX ADMIN — IBUPROFEN 800 MG: 400 TABLET, FILM COATED ORAL at 07:00

## 2023-02-04 RX ADMIN — SODIUM CHLORIDE 3000 MG: 900 INJECTION INTRAVENOUS at 07:05

## 2023-02-04 RX ADMIN — SODIUM CHLORIDE 3000 MG: 900 INJECTION INTRAVENOUS at 01:38

## 2023-02-04 RX ADMIN — GENTAMICIN SULFATE 385.6 MG: 40 INJECTION, SOLUTION INTRAMUSCULAR; INTRAVENOUS at 07:54

## 2023-02-04 RX ADMIN — PRENATAL VIT W/ FE FUMARATE-FA TAB 27-0.8 MG 1 TABLET: 27-0.8 TAB at 09:42

## 2023-02-04 RX ADMIN — SODIUM CHLORIDE, PRESERVATIVE FREE 10 ML: 5 INJECTION INTRAVENOUS at 09:43

## 2023-02-04 ASSESSMENT — PAIN SCALES - GENERAL
PAINLEVEL_OUTOF10: 3
PAINLEVEL_OUTOF10: 2

## 2023-02-04 NOTE — PROGRESS NOTES
Pt currently sleeping, pm assessment was completed earlier and pt was medicated with ibuprofen 800 mg po for abd pain 5/10 at 2307. Pt also had a snack. Pt denied any nausea or sob. Pt was up to restroom and also showered last night. Pt denied any dizziness or weakness. Pt's abd incision CDI, abd binder also on. Pt voiced the need to go home and be with her baby. Pt was given extra baby bottles and breast pump from OB unit. Vitals are WNL. Call light within reach. Lab was called regarding gentamicin trough level order. Lab to drawn soon.

## 2023-02-04 NOTE — PROGRESS NOTES
Called lab about gentamicin trough level order again and  she stated that she will call them to come and draw lab but that this lab is a sent out and wouldn't go out till 0800 to [facility], they are sent out at 0800 and 2000 just twice a day. Results would be back around noon. Will let pharmacy know.

## 2023-02-04 NOTE — DISCHARGE SUMMARY
Obstetric Discharge Summary    Reasons for Admission on 2023 11:39 PM    Observation/Evaluation (Medical Complications): endometritis    Prenatal Procedures  None  Pt received care at another institution  Intrapartum Procedures  Delivery Method: , Low Transverse    Postpartum Procedures  Antibiotics:for poss endometritis    Jack Data  This patient has no babies on file.   Discharge pt baby is at home  Complications: Yes - endometritis    Discharge Diagnosis  Patient Active Problem List    Diagnosis Date Noted    Endometritis following delivery 2023    21 weeks gestation of pregnancy 2022    Food poisoning [id number]    Complication of pregnancy in first trimester 2022    Abdominal cramping 2022       Discharge Information  Current Discharge Medication List        START taking these medications    Details   ferrous sulfate (FE TABS) 325 (65 Fe) MG EC tablet Take 1 tablet by mouth 2 times daily  Qty: 90 tablet, Refills: 3           CONTINUE these medications which have CHANGED    Details   cephALEXin (KEFLEX) 500 MG capsule Take 1 capsule by mouth 4 times daily  Qty: 28 capsule, Refills: 0           CONTINUE these medications which have NOT CHANGED    Details   Prenatal Vit w/Lt-Cndvjwvwo-WK (PNV PO) Take 1 tablet by mouth daily           STOP taking these medications       Ascorbic Acid (VITAMIN C) 250 MG tablet Comments:   Reason for Stopping:         ferrous gluconate (FERGON) 324 (38 Fe) MG tablet Comments:   Reason for Stopping:         lidocaine (LIDODERM) 5 % Comments:   Reason for Stopping:         acetaminophen (TYLENOL) 500 MG tablet Comments:   Reason for Stopping:         ibuprofen (IBU) 400 MG tablet Comments:   Reason for Stopping:         ondansetron (ZOFRAN ODT) 4 MG disintegrating tablet Comments:   Reason for Stopping:         ALAYCEN  1-35 MG-MCG per tablet Comments:   Reason for Stopping:         hydrocortisone (ANUSOL-HC) 25 MG suppository Comments: Reason for Stopping:         norethindrone-ethinyl estradiol (LOESTRIN 1/20, 21,) 1-20 MG-MCG per tablet Comments:   Reason for Stopping:         norethindrone-ethinyl estradiol-Fe (LO LOESTRIN FE) 1 MG-10 MCG / 10 MCG tablet Comments:   Reason for Stopping:         naproxen (NAPROSYN) 375 MG tablet Comments:   Reason for Stopping:               No discharge procedures on file.     Discharge to: Home  Pt to FU with her OB in 1 week  Sent home with iron for blood loss anemia and Keflex 500mg q6 hr for 1 week      Discharge Date: February 4, 2023      Lorri Jacome DO  February 4, 2023

## 2023-02-04 NOTE — PROGRESS NOTES
Shift assessment completed, vss. Pt alert & oriented, cooperative. Ambulatory. Pt is post- day 10. Incision looks good, clean/dry/intact. Incision is closed by steri-strips; pt states one fell off. Pt states her last BM was 2/2, passing gas & active bowel sounds x4.     Electronically signed by Kaila Torres RN on 23 at 8:18 AM EST

## 2023-02-05 LAB
ORGANISM: ABNORMAL
URINE CULTURE, ROUTINE: ABNORMAL
URINE CULTURE, ROUTINE: ABNORMAL

## 2023-02-06 LAB
ORGANISM: ABNORMAL
URINE CULTURE, ROUTINE: ABNORMAL
URINE CULTURE, ROUTINE: ABNORMAL

## 2023-02-08 LAB
BLOOD CULTURE, ROUTINE: NORMAL
CULTURE, BLOOD 2: NORMAL

## 2023-02-14 ENCOUNTER — OFFICE VISIT (OUTPATIENT)
Dept: OBGYN CLINIC | Age: 18
End: 2023-02-14

## 2023-02-14 VITALS
BODY MASS INDEX: 27 KG/M2 | HEIGHT: 66 IN | DIASTOLIC BLOOD PRESSURE: 58 MMHG | WEIGHT: 168 LBS | HEART RATE: 84 BPM | SYSTOLIC BLOOD PRESSURE: 90 MMHG

## 2023-02-14 DIAGNOSIS — Z98.891 HX OF CESAREAN SECTION: ICD-10-CM

## 2023-02-14 DIAGNOSIS — T81.44XS: Primary | ICD-10-CM

## 2023-02-14 PROCEDURE — 99024 POSTOP FOLLOW-UP VISIT: CPT | Performed by: OBSTETRICS & GYNECOLOGY

## 2023-02-14 RX ORDER — PHENAZOPYRIDINE HYDROCHLORIDE 200 MG/1
200 TABLET, FILM COATED ORAL 3 TIMES DAILY PRN
Qty: 15 TABLET | Refills: 0 | Status: SHIPPED | OUTPATIENT
Start: 2023-02-14 | End: 2023-02-17

## 2023-02-14 RX ORDER — POLYETHYLENE GLYCOL 3350 17 G/17G
POWDER, FOR SOLUTION ORAL
Qty: 1020 G | Refills: 0 | Status: SHIPPED | OUTPATIENT
Start: 2023-02-14

## 2023-02-14 RX ORDER — DOCUSATE SODIUM 100 MG/1
100 CAPSULE, LIQUID FILLED ORAL 2 TIMES DAILY PRN
Qty: 60 CAPSULE | Refills: 2 | Status: SHIPPED | OUTPATIENT
Start: 2023-02-14

## 2023-02-14 RX ORDER — AMOXICILLIN AND CLAVULANATE POTASSIUM 875; 125 MG/1; MG/1
1 TABLET, FILM COATED ORAL 2 TIMES DAILY
Qty: 20 TABLET | Refills: 0 | Status: SHIPPED | OUTPATIENT
Start: 2023-02-14 | End: 2023-02-24

## 2023-02-14 NOTE — PROGRESS NOTES
Op Exam     Favian Matthews is a 21y.o. year old female who presents to the office  3 weeks status post pLTCS with outside physician for failure to dilate , failed induction , pregnancy complicated with IUGR at 35 weeks . Bowel movements are Normal.  The patient is not having any pain. .  Mentions postop course complicated with sepsis for which she received antibiotics with side effects and constipation , hence she switched providers to us due to the complicated course and loss of trust according to her account. The patient states she is unsure with post-procedure results. Vitals:  BP (!) 90/58 (Site: Right Upper Arm, Position: Sitting, Cuff Size: Medium Adult)   Pulse 84   Ht 5' 6\" (1.676 m)   Wt 168 lb (76.2 kg)   LMP  (LMP Unknown)   Breastfeeding Yes Comment: And bottle  BMI 27.12 kg/m²   Allergies:  Tylenol [acetaminophen]  No past medical history on file. Past Surgical History:   Procedure Laterality Date     SECTION      FOOT SURGERY      FOOT SURGERY       cyst removal     OB History          2    Para   1    Term   1            AB   1    Living   1         SAB   1    IAB        Ectopic        Molar        Multiple        Live Births   1              No family history on file.   Social History     Socioeconomic History    Marital status: Single     Spouse name: Not on file    Number of children: Not on file    Years of education: Not on file    Highest education level: Not on file   Occupational History    Not on file   Tobacco Use    Smoking status: Never    Smokeless tobacco: Never   Substance and Sexual Activity    Alcohol use: Never    Drug use: Never    Sexual activity: Yes   Other Topics Concern    Not on file   Social History Narrative    Not on file     Social Determinants of Health     Financial Resource Strain: Not on file   Food Insecurity: Not on file   Transportation Needs: Not on file   Physical Activity: Not on file   Stress: Not on file   Social Connections: Not on file   Intimate Partner Violence: Not on file   Housing Stability: Not on file       Contraceptive method:  none    Patient's medications, allergies, past medical, surgical,social and family histories were reviewed and updated as appropriate. Review of Systems  Review of Systems   All other systems reviewed and are negative. Review of Systems  Constitutional: Negative for chills and fever. Respiratory: Negative for coughand shortness of breath. Cardiovascular: Negative for chestpain and palpitations. Gastrointestinal: Negative for nauseaand vomiting. Genitourinary: Negative for dysuria, frequencyand urgency. Musculoskeletal: Negative for myalgias. Neurological: Negative for dizziness, seizures andheadaches. Psychiatric/Behavioral: Negativefor depression and suicidal ideas. Exam  Physical Exam  Exam   Constitutional: Sheis well-developed, well-nourished, and in no distress. Cardiovascular: Normal rate and regularrhythm. Pulmonary/Chest: Effort normal and breath sounds normal.  Abdominal: Soft. Bowel sounds are normal.   Incision/s intact clean and dry. Healingadequately.  :   Genitourinary Comments: deferred      Assessment:     Patient state:  Doingwell postoperatively. Operative findings again reviewed. Diagnosis Orders   1. Sepsis following procedure, sequela  CBC with Auto Differential    Comprehensive Metabolic Panel    Culture, Urine      2. Hx of  section             Pathology reportdiscussed. N/A      Plan:     F/u work up ordered to confirm resolution of sepsis . Stool softeners given for constipation   Discussed BC for irregular menses   Patient to complete work up and return in 2 weeks     Orders Placed This Encounter   Procedures    Culture, Urine     Standing Status:   Future     Number of Occurrences:   1     Standing Expiration Date:   2024     Order Specific Question:   Specify (ex-cath, midstream, cysto, etc)?      Answer: midstream    CBC with Auto Differential     Standing Status:   Future     Number of Occurrences:   1     Standing Expiration Date:   2/14/2024    Comprehensive Metabolic Panel     Standing Status:   Future     Number of Occurrences:   1     Standing Expiration Date:   2/14/2024     Orders Placed This Encounter   Medications    docusate sodium (COLACE) 100 MG capsule     Sig: Take 1 capsule by mouth 2 times daily as needed for Constipation     Dispense:  60 capsule     Refill:  2    polyethylene glycol (MIRALAX) 17 GM/SCOOP powder     Sig: Use once to twice daily as needed by directions on the bottle     Dispense:  1020 g     Refill:  0    phenazopyridine (PYRIDIUM) 200 MG tablet     Sig: Take 1 tablet by mouth 3 times daily as needed for Pain     Dispense:  15 tablet     Refill:  0    amoxicillin-clavulanate (AUGMENTIN) 875-125 MG per tablet     Sig: Take 1 tablet by mouth 2 times daily for 10 days     Dispense:  20 tablet     Refill:  0

## 2023-02-15 RX ORDER — NITROFURANTOIN 25; 75 MG/1; MG/1
100 CAPSULE ORAL 2 TIMES DAILY
Qty: 20 CAPSULE | Refills: 0 | Status: SHIPPED | OUTPATIENT
Start: 2023-02-15 | End: 2023-02-25

## 2023-02-20 ENCOUNTER — HOSPITAL ENCOUNTER (EMERGENCY)
Age: 18
Discharge: HOME | End: 2023-02-20
Attending: EMERGENCY MEDICINE
Payer: COMMERCIAL

## 2023-02-20 VITALS
WEIGHT: 170 LBS | SYSTOLIC BLOOD PRESSURE: 110 MMHG | HEART RATE: 98 BPM | HEIGHT: 66 IN | TEMPERATURE: 98.8 F | OXYGEN SATURATION: 100 % | DIASTOLIC BLOOD PRESSURE: 76 MMHG | BODY MASS INDEX: 27.32 KG/M2 | RESPIRATION RATE: 18 BRPM

## 2023-02-20 DIAGNOSIS — R10.30 LOWER ABDOMINAL PAIN: Primary | ICD-10-CM

## 2023-02-20 LAB
BACTERIA: NEGATIVE /HPF
BILIRUBIN URINE: NEGATIVE
BLOOD, URINE: ABNORMAL
CLARITY: CLEAR
COLOR: ABNORMAL
EPITHELIAL CELLS, UA: ABNORMAL /HPF (ref 0–5)
GLUCOSE URINE: NEGATIVE MG/DL
HCG, URINE, POC: NEGATIVE
HYALINE CASTS: ABNORMAL /HPF (ref 0–5)
KETONES, URINE: ABNORMAL MG/DL
LEUKOCYTE ESTERASE, URINE: ABNORMAL
Lab: NORMAL
NEGATIVE QC PASS/FAIL: NORMAL
NITRITE, URINE: NEGATIVE
PH UA: 6.5 (ref 5–9)
POSITIVE QC PASS/FAIL: NORMAL
PROTEIN UA: NEGATIVE MG/DL
RBC UA: ABNORMAL /HPF (ref 0–5)
SPECIFIC GRAVITY UA: 1.02 (ref 1–1.03)
URINE REFLEX TO CULTURE: YES
UROBILINOGEN, URINE: 1 E.U./DL
WBC UA: ABNORMAL /HPF (ref 0–5)

## 2023-02-20 PROCEDURE — 99283 EMERGENCY DEPT VISIT LOW MDM: CPT

## 2023-02-20 PROCEDURE — 87086 URINE CULTURE/COLONY COUNT: CPT

## 2023-02-20 PROCEDURE — 81001 URINALYSIS AUTO W/SCOPE: CPT

## 2023-02-20 ASSESSMENT — ENCOUNTER SYMPTOMS
NAUSEA: 1
ABDOMINAL PAIN: 1

## 2023-02-20 ASSESSMENT — PAIN - FUNCTIONAL ASSESSMENT: PAIN_FUNCTIONAL_ASSESSMENT: 0-10

## 2023-02-20 ASSESSMENT — PAIN SCALES - GENERAL: PAINLEVEL_OUTOF10: 8

## 2023-02-21 ENCOUNTER — OFFICE VISIT (OUTPATIENT)
Dept: OBGYN CLINIC | Age: 18
End: 2023-02-21
Payer: COMMERCIAL

## 2023-02-21 VITALS
HEART RATE: 112 BPM | SYSTOLIC BLOOD PRESSURE: 102 MMHG | WEIGHT: 168 LBS | BODY MASS INDEX: 27.12 KG/M2 | DIASTOLIC BLOOD PRESSURE: 60 MMHG

## 2023-02-21 PROBLEM — A41.9 SEPTICEMIA (HCC): Status: ACTIVE | Noted: 2023-02-21

## 2023-02-21 PROCEDURE — G8484 FLU IMMUNIZE NO ADMIN: HCPCS | Performed by: OBSTETRICS & GYNECOLOGY

## 2023-02-21 PROCEDURE — 1036F TOBACCO NON-USER: CPT | Performed by: OBSTETRICS & GYNECOLOGY

## 2023-02-21 PROCEDURE — 99214 OFFICE O/P EST MOD 30 MIN: CPT | Performed by: OBSTETRICS & GYNECOLOGY

## 2023-02-21 PROCEDURE — 1111F DSCHRG MED/CURRENT MED MERGE: CPT | Performed by: OBSTETRICS & GYNECOLOGY

## 2023-02-21 PROCEDURE — G8419 CALC BMI OUT NRM PARAM NOF/U: HCPCS | Performed by: OBSTETRICS & GYNECOLOGY

## 2023-02-21 PROCEDURE — G8428 CUR MEDS NOT DOCUMENT: HCPCS | Performed by: OBSTETRICS & GYNECOLOGY

## 2023-02-21 NOTE — ED TRIAGE NOTES
Presents with complaint of nausea and pain at incision site.  deliver on 23. Hospitalized for septicemia this month, followed up with OB after discharge. Vitals stable. No fever.

## 2023-02-21 NOTE — ED PROVIDER NOTES
[facility] ED  EMERGENCY DEPARTMENT ENCOUNTER      Pt Name: Chasity Mike  MRN: [id number]  [place] 2005  Date of evaluation: 2023  Provider: Yolette Mo MD    CHIEF COMPLAINT       Chief Complaint   Patient presents with    Abdominal Pain         HISTORY OF PRESENT ILLNESS   (Location/Symptom, Timing/Onset, Context/Setting, Quality, Duration, Modifying Factors, Severity)  Note limiting factors. 20-year-old female presenting with lower abdominal pain. Symptoms ongoing for 3-4 days. She notes recent  complicated by possible endometritis. Not currently taking any antibiotics. Denies any fevers or drainage from incision site. No significant vaginal bleeding. She denies any urinary symptoms. Pain is localized diffusely across the lower abdomen. Has not taken anything for relief prior to arrival.  No fevers. Nursing Notes were reviewed. REVIEW OF SYSTEMS    (2-9 systems for level 4, 10 or more for level 5)     Review of Systems   Gastrointestinal:  Positive for abdominal pain and nausea. All other systems reviewed and are negative. Except as noted above the remainder of the review of systems was reviewed and negative. PAST MEDICAL HISTORY   History reviewed. No pertinent past medical history.       SURGICAL HISTORY       Past Surgical History:   Procedure Laterality Date     SECTION      FOOT SURGERY      FOOT SURGERY       cyst removal         CURRENT MEDICATIONS       Current Discharge Medication List        CONTINUE these medications which have NOT CHANGED    Details   nitrofurantoin, macrocrystal-monohydrate, (MACROBID) 100 MG capsule Take 1 capsule by mouth 2 times daily for 10 days  Qty: 20 capsule, Refills: 0      PRENATAL MV & MIN W/FE-FA-CA PO Take 1 tablet by mouth daily      docusate sodium (COLACE) 100 MG capsule Take 1 capsule by mouth 2 times daily as needed for Constipation  Qty: 60 capsule, Refills: 2      polyethylene glycol (MIRALAX) 17 GM/SCOOP powder Use once to twice daily as needed by directions on the bottle  Qty: 1020 g, Refills: 0      amoxicillin-clavulanate (AUGMENTIN) 875-125 MG per tablet Take 1 tablet by mouth 2 times daily for 10 days  Qty: 20 tablet, Refills: 0      cephALEXin (KEFLEX) 500 MG capsule Take 1 capsule by mouth 4 times daily  Qty: 28 capsule, Refills: 0      ferrous sulfate (FE TABS) 325 (65 Fe) MG EC tablet Take 1 tablet by mouth 2 times daily  Qty: 90 tablet, Refills: 3             ALLERGIES     Tylenol [acetaminophen]    FAMILY HISTORY     History reviewed. No pertinent family history. SOCIAL HISTORY       Social History     Socioeconomic History    Marital status: Single     Spouse name: None    Number of children: None    Years of education: None    Highest education level: None   Tobacco Use    Smoking status: Never    Smokeless tobacco: Never   Substance and Sexual Activity    Alcohol use: Never    Drug use: Never    Sexual activity: Yes       SCREENINGS    [place] Coma Scale  Eye Opening: Spontaneous  Best Verbal Response: Oriented  Best Motor Response: Obeys commands  New Coma Scale Score: 15          PHYSICAL EXAM    (up to 7 for level 4, 8 or more for level 5)     ED Triage Vitals   BP Temp Temp Source Heart Rate Resp SpO2 Height Weight - Scale   02/20/23 1921 02/20/23 1919 02/20/23 1919 02/20/23 1919 02/20/23 1919 02/20/23 1919 02/20/23 1919 02/20/23 1919   110/76 98.8 °F (37.1 °C) Oral 98 18 100 % 5' 6\" (1.676 m) 170 lb (77.1 kg)       Physical Exam  Vitals and nursing note reviewed. Constitutional:       General: She is not in acute distress. Appearance: Normal appearance. She is well-developed. She is not ill-appearing. HENT:      Head: Normocephalic and atraumatic. Mouth/Throat:      Mouth: Mucous membranes are moist.      Pharynx: Oropharynx is clear. Eyes:      Extraocular Movements: Extraocular movements intact.       Conjunctiva/sclera: Conjunctivae normal. Cardiovascular:      Rate and Rhythm: Normal rate and regular rhythm. Pulmonary:      Effort: Pulmonary effort is normal.      Breath sounds: Normal breath sounds. Abdominal:      General: Bowel sounds are normal.      Palpations: Abdomen is soft. Tenderness: There is abdominal tenderness. There is no guarding or rebound. Comments: Incision is clean, dry intact; mild diffuse lower abdominal tenderness   Musculoskeletal:         General: No deformity. Normal range of motion. Cervical back: Normal range of motion and neck supple. Skin:     General: Skin is warm and dry. Capillary Refill: Capillary refill takes less than 2 seconds. Neurological:      General: No focal deficit present. Mental Status: She is alert and oriented to person, place, and time. Mental status is at baseline. Cranial Nerves: No cranial nerve deficit. Psychiatric:         Thought Content:  Thought content normal.       DIAGNOSTIC RESULTS     EKG: All EKG's are interpreted by the Emergency Department Physician who either signs or Co-signs this chart in the absence of a cardiologist.    RADIOLOGY:   Non-plain film images such as CT, Ultrasound and MRI are read by the radiologist. Plain radiographic images are visualized and preliminarily interpreted by the emergency physician with the below findings:    Interpretation per the Radiologist below, if available at the time of this note:    No orders to display       LABS:  [address] - Abnormal; Notable for the following components:       Result Value    Color, UA DARK YELLOW (*)     Ketones, Urine TRACE (*)     Blood, Urine TRACE (*)     Leukocyte Esterase, Urine MODERATE (*)     All other components within normal limits   MICROSCOPIC URINALYSIS - Abnormal; Notable for the following components:    WBC, UA 10-20 (*)     RBC, UA 3-5 (*)     All other components within normal limits   CULTURE, URINE   POC PREGNANCY UR-QUAL       All other labs were within normal range or not returned as of this dictation.    EMERGENCY DEPARTMENT COURSE and DIFFERENTIAL DIAGNOSIS/MDM:   Vitals:    Vitals:    02/20/23 1919 02/20/23 1921   BP:  110/76   Pulse: 98    Resp: 18    Temp: 98.8 °F (37.1 °C)    TempSrc: Oral    SpO2: 100%    Weight: 170 lb (77.1 kg)    Height: 5' 6\" (1.676 m)        MDM  Number of Diagnoses or Management Options  Lower abdominal pain  Diagnosis management comments: Reviewed blood work from previous visit.  White count is downward trending.  Afebrile and otherwise well appearing today.  Patient is breast-feeding and recommended ibuprofen for symptom relief.  Should follow-up with OB.  Urinalysis is not consistent with infection.  Patient will be discharged home in good condition.  Patient has been hemodynamically stable throughout ED course and is appropriate for outpatient follow up.  Patient should follow up with OB in 2-3 days or return to ED immediately for any new or worsening symptoms.  Patient is well appearing on discharge and agreeable with plan of care.        Procedures    CRITICAL CARE TIME   Total Critical Care time was 0 minutes, excluding separately reportable procedures.  There was a high probability of clinically significant/life threatening deterioration in the patient's condition which required my urgent intervention.       FINAL IMPRESSION      1. Lower abdominal pain          DISPOSITION/PLAN   DISPOSITION Decision To Discharge 02/20/2023 08:41:19 PM      (Please note that portions of this note were completed with a voice recognition program.  Efforts were made to edit the dictations but occasionally words are mis-transcribed.)    Carlos Marks MD (electronically signed)  Attending Emergency Physician        Carlos Marks MD  02/20/23 2047

## 2023-02-21 NOTE — ED NOTES
Discharge instructions, medications and follow up care reviewed. Pt verbalized understanding. Pt A&Ox4. Afebrile with skin warm and dry. Respirations and breathing equal and unlabored. Pt stable at time of departure.         Cathleen Leon RN  02/20/23 2052

## 2023-02-21 NOTE — PROGRESS NOTES
HPI:  Minerva Collins (: 2005) is a 21 y.o. female, Established patient, here for evaluation of the following chief complaint(s):  Follow-up (ER pelvic pain like crampy and uncomfortable. Also feels a knot. CT scan)  Went to the emergency room last night complaining of abdominal pain. Her CT scan was negative. She had a  at [facility] and she was then felt endometritis and was hospitalized at [facility] for couple of days she was discharged on antibiotics. She is complaining of abdominal pain today. She is also complaining of postpartum depression      SUBJECTIVE/OBJECTIVE:    Past Surgical History:   Procedure Laterality Date     SECTION      FOOT SURGERY      FOOT SURGERY       cyst removal        Review of Systems   Genitourinary:  Positive for pelvic pain. Physical Exam    Vitals:    23 1348   BP: 102/60   Pulse: (!) 112   Weight: 168 lb (76.2 kg)         ASSESSMENT/PLAN:     Diagnosis Orders   1. Postpartum depression        Postoperative pain    PLAN: Motrin every 6 hours for discomfort  We will start Zoloft 50 mg nightly and patient to follow-up in 1 month as needed      No follow-ups on file. On this date 2023 I have spent 30 minutes reviewing previous notes, test results and face to face with the patient discussing the diagnosis and importance of compliance with the treatment plan as well as documenting on the day of the visit. An electronic signature was used to authenticate this note. Please note this report has been partially produced using speech recognition software and may cause contain errors related to that system including grammar, punctuation and spelling as well as words and phrases that may seem inappropriate. If there are questions or concerns please feel free to contact me to clarify.

## 2023-02-22 LAB — URINE CULTURE, ROUTINE: NORMAL

## 2023-03-08 ENCOUNTER — TELEPHONE (OUTPATIENT)
Dept: OBGYN CLINIC | Age: 18
End: 2023-03-08

## 2023-05-22 ENCOUNTER — HOSPITAL ENCOUNTER (EMERGENCY)
Age: 18
Discharge: HOME | End: 2023-05-22
Payer: COMMERCIAL

## 2023-05-22 VITALS
HEART RATE: 75 BPM | SYSTOLIC BLOOD PRESSURE: 110 MMHG | BODY MASS INDEX: 27.32 KG/M2 | OXYGEN SATURATION: 99 % | RESPIRATION RATE: 17 BRPM | HEIGHT: 66 IN | WEIGHT: 170 LBS | DIASTOLIC BLOOD PRESSURE: 79 MMHG | TEMPERATURE: 98.1 F

## 2023-05-22 DIAGNOSIS — K59.00 CONSTIPATION, UNSPECIFIED CONSTIPATION TYPE: Primary | ICD-10-CM

## 2023-05-22 PROCEDURE — 99283 EMERGENCY DEPT VISIT LOW MDM: CPT

## 2023-05-22 RX ORDER — DIBUCAINE 1 G/100G
OINTMENT TOPICAL 4 TIMES DAILY PRN
Qty: 28 G | Refills: 0 | Status: SHIPPED | OUTPATIENT
Start: 2023-05-22

## 2023-05-22 RX ORDER — IBUPROFEN 800 MG/1
800 TABLET ORAL EVERY 8 HOURS PRN
Qty: 30 TABLET | Refills: 0 | Status: SHIPPED | OUTPATIENT
Start: 2023-05-22

## 2023-05-22 RX ORDER — POLYETHYLENE GLYCOL 3350, SODIUM CHLORIDE, POTASSIUM CHLORIDE, SODIUM BICARBONATE, AND SODIUM SULFATE 240; 5.84; 2.98; 6.72; 22.72 G/4L; G/4L; G/4L; G/4L; G/4L
POWDER, FOR SOLUTION ORAL
Qty: 4000 ML | Refills: 0 | Status: SHIPPED | OUTPATIENT
Start: 2023-05-22

## 2023-05-22 ASSESSMENT — ENCOUNTER SYMPTOMS
RHINORRHEA: 0
SORE THROAT: 0
COLOR CHANGE: 0
DIARRHEA: 0
ABDOMINAL PAIN: 1
BLOOD IN STOOL: 0
COUGH: 0
VOMITING: 0
SHORTNESS OF BREATH: 0
NAUSEA: 0
CONSTIPATION: 1

## 2023-05-22 ASSESSMENT — PAIN DESCRIPTION - LOCATION: LOCATION: HEAD

## 2023-05-22 ASSESSMENT — PAIN DESCRIPTION - FREQUENCY: FREQUENCY: INTERMITTENT

## 2023-05-22 ASSESSMENT — PAIN SCALES - GENERAL: PAINLEVEL_OUTOF10: 5

## 2023-05-22 ASSESSMENT — PAIN DESCRIPTION - ONSET: ONSET: ON-GOING

## 2023-05-22 ASSESSMENT — PAIN DESCRIPTION - PAIN TYPE: TYPE: ACUTE PAIN

## 2023-05-22 ASSESSMENT — LIFESTYLE VARIABLES
HOW MANY STANDARD DRINKS CONTAINING ALCOHOL DO YOU HAVE ON A TYPICAL DAY: PATIENT DOES NOT DRINK
HOW OFTEN DO YOU HAVE A DRINK CONTAINING ALCOHOL: NEVER

## 2023-05-22 ASSESSMENT — PAIN DESCRIPTION - DESCRIPTORS: DESCRIPTORS: ACHING

## 2023-05-22 ASSESSMENT — PAIN - FUNCTIONAL ASSESSMENT
PAIN_FUNCTIONAL_ASSESSMENT: ACTIVITIES ARE NOT PREVENTED
PAIN_FUNCTIONAL_ASSESSMENT: 0-10

## 2023-05-23 NOTE — ED TRIAGE NOTES
The pt has c/o constipation, her lat bowel movement was Saturday and it was a small amount. Pt states when she tries to go she gets a bad headache and there is a tearing sensation. Pt has tried OTC medications but it hasn't helped.

## 2023-05-23 NOTE — ED PROVIDER NOTES
[address] [facility] ED  eMERGENCY dEPARTMENT eNCOUnter      Pt Name: Eri Walker  MRN: [id number]  [place] 2005  Date of evaluation: 2023  Provider: Dereck Evans, [address]    Eri Walker is a 21 y.o. female with PMHx of none presents to the emergency department with constipation. Pt being seen with child who also is constipated. She states since Csection 3 months ago she has been having intermittent constipation. Her last BM was 2 days ago and was taking miralax before then with some relief it seemed. She does have rectal pain. She has intermittent minimal lower abdominal pain but also on bactrim for UTI. She denies fevers, chest pain, shortness of breath, nausea, vomiting. She also has stool softeners at home but doesn't seem to be using them. HPI    Nursing Notes were reviewed. REVIEW OF SYSTEMS       Review of Systems   Constitutional:  Negative for appetite change, chills and fever. HENT:  Negative for congestion, rhinorrhea and sore throat. Respiratory:  Negative for cough and shortness of breath. Cardiovascular:  Negative for chest pain. Gastrointestinal:  Positive for abdominal pain and constipation. Negative for blood in stool, diarrhea, nausea and vomiting. Genitourinary:  Negative for difficulty urinating. Musculoskeletal:  Negative for neck stiffness. Skin:  Negative for color change and rash. Neurological:  Negative for dizziness, syncope, weakness, light-headedness, numbness and headaches. All other systems reviewed and are negative. PAST MEDICAL HISTORY   No past medical history on file.       SURGICAL HISTORY       Past Surgical History:   Procedure Laterality Date     SECTION      FOOT SURGERY      FOOT SURGERY       cyst removal         CURRENT MEDICATIONS       Previous Medications    CEPHALEXIN (KEFLEX) 500 MG CAPSULE    Take 1 capsule by mouth 4 times daily    DOCUSATE SODIUM (COLACE) 100 MG

## 2023-07-07 ENCOUNTER — HOSPITAL ENCOUNTER (EMERGENCY)
Age: 18
Discharge: HOME | End: 2023-07-07
Attending: EMERGENCY MEDICINE
Payer: COMMERCIAL

## 2023-07-07 VITALS
DIASTOLIC BLOOD PRESSURE: 71 MMHG | HEART RATE: 84 BPM | OXYGEN SATURATION: 99 % | HEIGHT: 66 IN | RESPIRATION RATE: 16 BRPM | SYSTOLIC BLOOD PRESSURE: 111 MMHG | WEIGHT: 170 LBS | TEMPERATURE: 98.1 F | BODY MASS INDEX: 27.32 KG/M2

## 2023-07-07 DIAGNOSIS — S39.012A BACK STRAIN, INITIAL ENCOUNTER: Primary | ICD-10-CM

## 2023-07-07 LAB
BILIRUB UR QL STRIP: NEGATIVE
CLARITY UR: CLEAR
COLOR UR: YELLOW
GLUCOSE UR STRIP-MCNC: NEGATIVE MG/DL
HCG, URINE, POC: NEGATIVE
HGB UR QL STRIP: NEGATIVE
KETONES UR STRIP-MCNC: NEGATIVE MG/DL
LEUKOCYTE ESTERASE UR QL STRIP: NEGATIVE
Lab: NORMAL
NEGATIVE QC PASS/FAIL: NORMAL
NITRITE UR QL STRIP: NEGATIVE
PH UR STRIP: 7.5 [PH] (ref 5–9)
POSITIVE QC PASS/FAIL: NORMAL
PROT UR STRIP-MCNC: NEGATIVE MG/DL
SP GR UR STRIP: 1.02 (ref 1–1.03)
UROBILINOGEN UR STRIP-ACNC: 1 E.U./DL

## 2023-07-07 PROCEDURE — 99284 EMERGENCY DEPT VISIT MOD MDM: CPT

## 2023-07-07 PROCEDURE — 6360000002 HC RX W HCPCS: Performed by: EMERGENCY MEDICINE

## 2023-07-07 PROCEDURE — 81003 URINALYSIS AUTO W/O SCOPE: CPT

## 2023-07-07 PROCEDURE — 96372 THER/PROPH/DIAG INJ SC/IM: CPT

## 2023-07-07 PROCEDURE — 87086 URINE CULTURE/COLONY COUNT: CPT

## 2023-07-07 RX ORDER — KETOROLAC TROMETHAMINE 30 MG/ML
30 INJECTION, SOLUTION INTRAMUSCULAR; INTRAVENOUS ONCE
Status: COMPLETED | OUTPATIENT
Start: 2023-07-07 | End: 2023-07-07

## 2023-07-07 RX ADMIN — KETOROLAC TROMETHAMINE 30 MG: 30 INJECTION, SOLUTION INTRAMUSCULAR; INTRAVENOUS at 15:45

## 2023-07-07 ASSESSMENT — PAIN DESCRIPTION - DESCRIPTORS: DESCRIPTORS: STABBING

## 2023-07-07 ASSESSMENT — PAIN SCALES - GENERAL: PAINLEVEL_OUTOF10: 9

## 2023-07-07 ASSESSMENT — PAIN DESCRIPTION - ORIENTATION: ORIENTATION: LEFT

## 2023-07-07 ASSESSMENT — PAIN DESCRIPTION - FREQUENCY: FREQUENCY: CONTINUOUS

## 2023-07-07 ASSESSMENT — PAIN DESCRIPTION - PAIN TYPE: TYPE: ACUTE PAIN

## 2023-07-07 ASSESSMENT — PAIN DESCRIPTION - LOCATION: LOCATION: ABDOMEN;FLANK

## 2023-07-07 ASSESSMENT — PAIN - FUNCTIONAL ASSESSMENT: PAIN_FUNCTIONAL_ASSESSMENT: 0-10

## 2023-07-07 ASSESSMENT — PAIN DESCRIPTION - ONSET: ONSET: ON-GOING

## 2023-07-07 ASSESSMENT — ENCOUNTER SYMPTOMS: BACK PAIN: 1

## 2023-07-07 NOTE — ED TRIAGE NOTES
Pt states abd pain that radiates to flank x1 day. Pt is A&OX4, skin intact, afebrile, breaths are equal and unlabored.

## 2023-07-08 LAB — BACTERIA UR CULT: NORMAL

## 2023-08-24 ENCOUNTER — PREP FOR PROCEDURE (OUTPATIENT)
Dept: GASTROENTEROLOGY | Age: 18
End: 2023-08-24

## 2023-08-24 ENCOUNTER — OFFICE VISIT (OUTPATIENT)
Dept: GASTROENTEROLOGY | Age: 18
End: 2023-08-24
Payer: COMMERCIAL

## 2023-08-24 VITALS
HEIGHT: 66 IN | OXYGEN SATURATION: 99 % | HEART RATE: 76 BPM | DIASTOLIC BLOOD PRESSURE: 62 MMHG | SYSTOLIC BLOOD PRESSURE: 108 MMHG | BODY MASS INDEX: 26.52 KG/M2 | WEIGHT: 165 LBS

## 2023-08-24 DIAGNOSIS — K62.5 BLOOD PER RECTUM: Primary | ICD-10-CM

## 2023-08-24 PROCEDURE — 99204 OFFICE O/P NEW MOD 45 MIN: CPT | Performed by: INTERNAL MEDICINE

## 2023-08-24 PROCEDURE — G8427 DOCREV CUR MEDS BY ELIG CLIN: HCPCS | Performed by: INTERNAL MEDICINE

## 2023-08-24 PROCEDURE — G8419 CALC BMI OUT NRM PARAM NOF/U: HCPCS | Performed by: INTERNAL MEDICINE

## 2023-08-24 PROCEDURE — 1036F TOBACCO NON-USER: CPT | Performed by: INTERNAL MEDICINE

## 2023-08-24 RX ORDER — DOXYCYCLINE HYCLATE 100 MG/1
100 CAPSULE ORAL 2 TIMES DAILY
COMMUNITY

## 2023-08-24 RX ORDER — POLYETHYLENE GLYCOL 3350, SODIUM CHLORIDE, SODIUM BICARBONATE, POTASSIUM CHLORIDE 420; 11.2; 5.72; 1.48 G/4L; G/4L; G/4L; G/4L
4000 POWDER, FOR SOLUTION ORAL ONCE
Qty: 4000 ML | Refills: 0 | Status: SHIPPED | OUTPATIENT
Start: 2023-08-24 | End: 2023-08-24

## 2023-08-24 ASSESSMENT — ENCOUNTER SYMPTOMS
CHEST TIGHTNESS: 0
VOICE CHANGE: 0
ABDOMINAL PAIN: 0
VOMITING: 0
TROUBLE SWALLOWING: 0
RECTAL PAIN: 0
DIARRHEA: 0
BLOOD IN STOOL: 0
ABDOMINAL DISTENTION: 0
COLOR CHANGE: 0
CONSTIPATION: 1
WHEEZING: 0
PHOTOPHOBIA: 0
NAUSEA: 0
SHORTNESS OF BREATH: 0
EYE REDNESS: 0
EYE PAIN: 0
ANAL BLEEDING: 1

## 2023-08-24 NOTE — PROGRESS NOTES
Subjective:      Patient ID: Amelia Robertson is a 21 y.o. female who presents today for:  Chief Complaint   Patient presents with    New Patient     Patient with ongoing constipation issues since having her daughter (7 months ago). Uses Miralax and stool softeners as needed, with minimal relief. Blood in stool. Colonoscopy 6-7 years ago, normal    Constipation    Abdominal Pain       HPI  This is a pleasant 21year-old came in today for further relation management. Patient mentioned that she been having constipation and blood per rectum. She noticed constipation when pregnant she tried multiple bowel regimen  however no significant improvement noted. Patient continues to have intermittent blood per rectum especially after straining. No alternating diarrhea reported. No weight loss. Does endorse hard stool with less frequent bowel movements. She also mentioned that she will have abdominal pain when constipated. She reports that she will go to the bathroom every 3 to 4 days with hard stool verbatim with sensation of incomplete evacuation. Otherwise given the constipation and rectal bleed, patient came in today for further evaluation management  No past medical history on file.   Past Surgical History:   Procedure Laterality Date     SECTION      FOOT SURGERY      FOOT SURGERY       cyst removal     Social History     Socioeconomic History    Marital status: Single     Spouse name: Not on file    Number of children: Not on file    Years of education: Not on file    Highest education level: Not on file   Occupational History    Not on file   Tobacco Use    Smoking status: Never    Smokeless tobacco: Never   Substance and Sexual Activity    Alcohol use: Never    Drug use: Never    Sexual activity: Yes   Other Topics Concern    Not on file   Social History Narrative    Not on file     Social Determinants of Health     Financial Resource Strain: Not on file   Food Insecurity: Not on file

## 2023-09-05 ENCOUNTER — HOSPITAL ENCOUNTER (EMERGENCY)
Age: 18
Discharge: HOME | End: 2023-09-06
Payer: COMMERCIAL

## 2023-09-05 VITALS
DIASTOLIC BLOOD PRESSURE: 79 MMHG | OXYGEN SATURATION: 98 % | SYSTOLIC BLOOD PRESSURE: 110 MMHG | RESPIRATION RATE: 18 BRPM | HEART RATE: 76 BPM | TEMPERATURE: 98.4 F

## 2023-09-05 DIAGNOSIS — R10.12 LEFT UPPER QUADRANT ABDOMINAL PAIN: Primary | ICD-10-CM

## 2023-09-05 DIAGNOSIS — R11.2 NAUSEA AND VOMITING, UNSPECIFIED VOMITING TYPE: ICD-10-CM

## 2023-09-05 DIAGNOSIS — R10.9 FLANK PAIN: ICD-10-CM

## 2023-09-05 LAB
HCG, URINE, POC: NEGATIVE
Lab: 0
NEGATIVE QC PASS/FAIL: NORMAL
POSITIVE QC PASS/FAIL: NORMAL

## 2023-09-05 PROCEDURE — 99284 EMERGENCY DEPT VISIT MOD MDM: CPT

## 2023-09-05 RX ORDER — 0.9 % SODIUM CHLORIDE 0.9 %
1000 INTRAVENOUS SOLUTION INTRAVENOUS ONCE
Status: COMPLETED | OUTPATIENT
Start: 2023-09-05 | End: 2023-09-06

## 2023-09-05 RX ORDER — FENTANYL CITRATE 0.05 MG/ML
50 INJECTION, SOLUTION INTRAMUSCULAR; INTRAVENOUS ONCE
Status: COMPLETED | OUTPATIENT
Start: 2023-09-05 | End: 2023-09-06

## 2023-09-05 RX ORDER — ONDANSETRON 2 MG/ML
4 INJECTION INTRAMUSCULAR; INTRAVENOUS ONCE
Status: COMPLETED | OUTPATIENT
Start: 2023-09-05 | End: 2023-09-06

## 2023-09-06 ENCOUNTER — APPOINTMENT (OUTPATIENT)
Dept: CT IMAGING | Age: 18
End: 2023-09-06
Payer: COMMERCIAL

## 2023-09-06 LAB
ALBUMIN SERPL-MCNC: 4.6 G/DL (ref 3.5–4.6)
ALP SERPL-CCNC: 83 U/L (ref 40–130)
ALT SERPL-CCNC: 10 U/L (ref 0–33)
ANION GAP SERPL CALCULATED.3IONS-SCNC: 10 MEQ/L (ref 9–15)
AST SERPL-CCNC: 12 U/L (ref 0–35)
BASOPHILS # BLD: 0.1 K/UL (ref 0–0.2)
BASOPHILS NFR BLD: 1.4 %
BILIRUB SERPL-MCNC: 0.3 MG/DL (ref 0.2–0.7)
BILIRUB UR QL STRIP: NEGATIVE
BUN SERPL-MCNC: 13 MG/DL (ref 6–20)
CALCIUM SERPL-MCNC: 9.2 MG/DL (ref 8.5–9.9)
CHLORIDE SERPL-SCNC: 104 MEQ/L (ref 95–107)
CLARITY UR: CLEAR
CO2 SERPL-SCNC: 26 MEQ/L (ref 20–31)
COLOR UR: YELLOW
CREAT SERPL-MCNC: 0.75 MG/DL (ref 0.5–0.9)
EOSINOPHIL # BLD: 0.1 K/UL (ref 0–0.7)
EOSINOPHIL NFR BLD: 3 %
ERYTHROCYTE [DISTWIDTH] IN BLOOD BY AUTOMATED COUNT: 14 % (ref 11.5–14.5)
ERYTHROCYTE [SEDIMENTATION RATE] IN BLOOD BY WESTERGREN METHOD: 13 MM (ref 0–20)
GLOBULIN SER CALC-MCNC: 2.7 G/DL (ref 2.3–3.5)
GLUCOSE SERPL-MCNC: 96 MG/DL (ref 70–99)
GLUCOSE UR STRIP-MCNC: NEGATIVE MG/DL
HCT VFR BLD AUTO: 35.5 % (ref 37–47)
HGB BLD-MCNC: 11.7 G/DL (ref 12–16)
HGB UR QL STRIP: NEGATIVE
KETONES UR STRIP-MCNC: NEGATIVE MG/DL
LEUKOCYTE ESTERASE UR QL STRIP: NEGATIVE
LIPASE SERPL-CCNC: 47 U/L (ref 12–95)
LYMPHOCYTES # BLD: 1.9 K/UL (ref 1–4.8)
LYMPHOCYTES NFR BLD: 39.6 %
MCH RBC QN AUTO: 29.2 PG (ref 27–31.3)
MCHC RBC AUTO-ENTMCNC: 32.9 % (ref 33–37)
MCV RBC AUTO: 88.7 FL (ref 79.4–94.8)
MONOCYTES # BLD: 0.5 K/UL (ref 0.2–0.8)
MONOCYTES NFR BLD: 9.7 %
NEUTROPHILS # BLD: 2.3 K/UL (ref 1.4–6.5)
NEUTS SEG NFR BLD: 46.3 %
NITRITE UR QL STRIP: NEGATIVE
PH UR STRIP: 6.5 [PH] (ref 5–9)
PLATELET # BLD AUTO: 274 K/UL (ref 130–400)
POTASSIUM SERPL-SCNC: 3.8 MEQ/L (ref 3.4–4.9)
PROT SERPL-MCNC: 7.3 G/DL (ref 6.3–8)
PROT UR STRIP-MCNC: NEGATIVE MG/DL
RBC # BLD AUTO: 4.01 M/UL (ref 4.2–5.4)
SODIUM SERPL-SCNC: 140 MEQ/L (ref 135–144)
SP GR UR STRIP: 1.03 (ref 1–1.03)
URINE REFLEX TO CULTURE: NORMAL
UROBILINOGEN UR STRIP-ACNC: 1 E.U./DL
WBC # BLD AUTO: 4.9 K/UL (ref 4.5–11)

## 2023-09-06 PROCEDURE — 6360000002 HC RX W HCPCS: Performed by: PHYSICIAN ASSISTANT

## 2023-09-06 PROCEDURE — 85025 COMPLETE CBC W/AUTO DIFF WBC: CPT

## 2023-09-06 PROCEDURE — 96374 THER/PROPH/DIAG INJ IV PUSH: CPT

## 2023-09-06 PROCEDURE — 96375 TX/PRO/DX INJ NEW DRUG ADDON: CPT

## 2023-09-06 PROCEDURE — 85652 RBC SED RATE AUTOMATED: CPT

## 2023-09-06 PROCEDURE — 2580000003 HC RX 258: Performed by: PHYSICIAN ASSISTANT

## 2023-09-06 PROCEDURE — 74176 CT ABD & PELVIS W/O CONTRAST: CPT

## 2023-09-06 PROCEDURE — 81003 URINALYSIS AUTO W/O SCOPE: CPT

## 2023-09-06 PROCEDURE — 83690 ASSAY OF LIPASE: CPT

## 2023-09-06 PROCEDURE — 36415 COLL VENOUS BLD VENIPUNCTURE: CPT

## 2023-09-06 PROCEDURE — 80053 COMPREHEN METABOLIC PANEL: CPT

## 2023-09-06 RX ORDER — ONDANSETRON 4 MG/1
4 TABLET, FILM COATED ORAL EVERY 8 HOURS PRN
Qty: 20 TABLET | Refills: 0 | Status: SHIPPED | OUTPATIENT
Start: 2023-09-06

## 2023-09-06 RX ADMIN — SODIUM CHLORIDE 1000 ML: 9 INJECTION, SOLUTION INTRAVENOUS at 00:16

## 2023-09-06 RX ADMIN — FENTANYL CITRATE 50 MCG: 50 INJECTION INTRAMUSCULAR; INTRAVENOUS at 00:18

## 2023-09-06 RX ADMIN — ONDANSETRON 4 MG: 2 INJECTION INTRAMUSCULAR; INTRAVENOUS at 00:18

## 2023-09-06 ASSESSMENT — ENCOUNTER SYMPTOMS
VOMITING: 1
VOICE CHANGE: 0
ABDOMINAL DISTENTION: 0
BACK PAIN: 1
EYE DISCHARGE: 0
NAUSEA: 1
ANAL BLEEDING: 0
APNEA: 0
ABDOMINAL PAIN: 1

## 2023-09-06 ASSESSMENT — PAIN DESCRIPTION - LOCATION: LOCATION: ABDOMEN;HEAD

## 2023-09-06 ASSESSMENT — PAIN DESCRIPTION - DESCRIPTORS: DESCRIPTORS: THROBBING;SHARP

## 2023-09-06 ASSESSMENT — PAIN SCALES - GENERAL: PAINLEVEL_OUTOF10: 8

## 2023-09-06 NOTE — ED PROVIDER NOTES
[facility] ED  eMERGENCY dEPARTMENT eNCOUnter      Pt Name: Gwendolyn Patino  MRN: [id number]  [address] [facility] 2005  Date of evaluation: 9/5/2023  Provider: Luz Marina Meyer PA-C    CHIEF COMPLAINT       Chief Complaint   Patient presents with    Abdominal Pain     LLQ pain started 2 days ago, 2 hrs ago + N/V/D, HA and weakness         HISTORY OF PRESENT ILLNESS   (Location/Symptom, Timing/Onset,Context/Setting, Quality, Duration, Modifying Factors, Severity)  Note limiting factors. Gwendolyn Patino is a 21 y.o. female who presents to the emergency department  0 patient presents with left lower left upper quadrant flank pain positive nausea vomiting some diarrhea headache and weakness. Patient notes she has had issues with ovarian cyst on the left side in the past was seen by OB/GYN 5 days ago they did not find any infections. She notes that she is worse in the past 2 to 3 days. Symptoms worse with touch or motion nothing improves    HPI    NursingNotes were reviewed. REVIEW OF SYSTEMS    (2-9 systems for level 4, 10 or more for level 5)     Review of Systems   Constitutional:  Negative for activity change, appetite change and unexpected weight change. HENT:  Negative for ear discharge, nosebleeds and voice change. Eyes:  Negative for discharge. Respiratory:  Negative for apnea. Cardiovascular:  Negative for chest pain. Gastrointestinal:  Positive for abdominal pain, nausea and vomiting. Negative for abdominal distention and anal bleeding. Genitourinary:  Positive for flank pain. Negative for dysuria. Musculoskeletal:  Positive for back pain. Negative for joint swelling. Skin:  Negative for pallor. Neurological:  Negative for seizures and facial asymmetry. Hematological:  Does not bruise/bleed easily. Psychiatric/Behavioral:  Negative for behavioral problems, self-injury and sleep disturbance. All other systems reviewed and are negative.     Except as noted above the

## 2023-10-08 RX ORDER — SODIUM CHLORIDE 0.9 % (FLUSH) 0.9 %
5-40 SYRINGE (ML) INJECTION EVERY 12 HOURS SCHEDULED
OUTPATIENT
Start: 2023-10-08

## 2023-10-08 RX ORDER — SODIUM CHLORIDE 9 MG/ML
INJECTION, SOLUTION INTRAVENOUS CONTINUOUS
OUTPATIENT
Start: 2023-10-08

## 2023-10-08 RX ORDER — SODIUM CHLORIDE 9 MG/ML
INJECTION, SOLUTION INTRAVENOUS PRN
OUTPATIENT
Start: 2023-10-08

## 2023-12-15 NOTE — DISCHARGE INSTRUCTIONS
DISCHARGE SUMMARY  To be completed within 30 days of last clinical contact      Ana Noyola : 1986 MRN: [id number]      Date of Intake: 2020 Date of Last Session: 12/15/2023    Chief Complaint: Substance Use Disorder    Admission and Discharge   Diagnosis Codes: No diagnosis found.    The above named patient was discharged from  care on 12/15/2023 for the following reason(s): Patient has not responded to the provider's engagement effort    Treatment Provided: (check all that apply) Medication (See Medication List)    Summary of Patient Progress Toward Goals in the Treatment Plan: Patient was seen for a total of 1 sessions, including completion of initial session and establishing 1 treatment plan(s).    Last four PHQ 2/9 Test Results  0: Not at all  1: Several days  2: More than half the days  3: Nearly every day           2020     5:00 PM 2020     5:00 PM 2020    12:30 PM 2020     3:15 PM   PHQ9 SCREENING FLOWSHEET   Adult PHQ2 Score 4 0 2 2   Adult PHQ2 Interpretation Moderate Depression Minimal Depression     Adult PHQ9 Score 12 3 7 8   Adult PHQ9 Interpretation Further screening needed No further screening needed     Little interest or pleasure in activity 2 0 1 1   Feeling down, depressed or hopeless 2 0 1 1   Trouble falling or staying asleep or sleeping all the time 2 0 1 1   Feeling tired or having little energy 2 1 1 1   Poor appetite or overeating 1 0 1 1   Feeling bad about yourself or that you are a failure or have let yourself or family down 1 1 1 1   Trouble concentrating on things such as reading hte newspaper or watching TV 1 1 1 1   Moving or speaking slowly that other people have noticed or the opposite - being so fidgety or restless that you have been moving around a lot more than usual 1 0 0 1   Thoughts that you would be better off dead or of hurting yourself in some way 0 0 0 0        Last four GAD7 Assessments           2020     5:00 PM 2020     RETURN FOR NEW OR WORSENING SYMPTOMS. TYLENOL/ MOTRIN FOR PAIN. 11:35 AM 8/27/2020    11:34 AM 6/30/2020     5:00 PM   GAD7 Screening   GAD7 Score 11   7   Feeling nervous, anxious or on edge Several days   Several days   Not being able to stop or control worrying More than half the days   Several days   Worrying too much about different things More than half the days   Several days   Trouble relaxing More than half the days   Several days   Being so restless that it's hard to sit still More than half the days   Several days   Becoming easily annoyed or irritable Several days   Several days   Feeling afraid as if something awful might happen Several days   Several days   Ability to handle work, home and other people Very difficult   Somewhat difficult   Date/time completed by patient via [employer]  8/27/2020 11:35 AM    8/27/2020 11:35 AM 8/27/2020 11:34 AM    8/27/2020 11:34 AM         Current Outpatient Medications   Medication Sig Dispense Refill    aluminum chloride (DRYSOL) 20 % topical solution Apply topically daily as needed (hyperhydrosis). Apply topically to underarms daily as needed 35 mL 0    acyclovir (ZOVIRAX) 400 MG tablet Take 1 tablet by mouth 5 times daily. 25 tablet 0    traZODone (DESYREL) 50 MG tablet Take 1 tablet by mouth nightly. 30 tablet 0    hydrOXYzine (ATARAX) 50 MG tablet Take 1 tablet by mouth 4 times daily as needed for Anxiety. 90 tablet 0    amoxicillin-clavulanate (Augmentin) 875-125 MG per tablet Take 1 tablet by mouth every 12 hours. Take with food. 14 tablet 0    fluoxetine (PROzac) 20 MG tablet Take 1 tablet by mouth daily. 30 tablet 3    busPIRone (BUSPAR) 15 MG tablet Take 1 tablet by mouth 4 times daily. 120 tablet 3    Psyllium-Calcium (Metamucil Plus Calcium) Cap Take 1 capsule daily 600 capsule     polyethylene glycol (MiraLax) 17 GM/SCOOP powder Take 17 g by mouth daily. Stir and dissolve powder in any 4 to 8 ounces of beverage, then drink.      omeprazole (PriLOSEC) 40 MG capsule Take 1 capsule by mouth daily. 30 capsule 11     clobetasol (TEMOVATE) 0.05 % cream Apply topically 2 times daily. 30 g 1    Levonorgestrel (MIRENA IU) by Intrauterine route.       No current facility-administered medications for this visit.       Discharge Recommendations: (Include names and addresses of facilities, persons or programs the patient was referred to following discharge.) none    Remaining Discharge Needs: (Include treatment issues not addressed.) none      Giuliano Moore MD Date: 12/15/2023   Time: 11:33 AM

## 2024-01-17 ENCOUNTER — OFFICE VISIT (OUTPATIENT)
Dept: OBGYN CLINIC | Age: 19
End: 2024-01-17
Payer: COMMERCIAL

## 2024-01-17 VITALS
BODY MASS INDEX: 28.25 KG/M2 | SYSTOLIC BLOOD PRESSURE: 122 MMHG | DIASTOLIC BLOOD PRESSURE: 74 MMHG | WEIGHT: 175 LBS | HEART RATE: 75 BPM

## 2024-01-17 DIAGNOSIS — N89.8 VAGINAL ODOR: Primary | ICD-10-CM

## 2024-01-17 DIAGNOSIS — N76.0 BACTERIAL VAGINITIS: ICD-10-CM

## 2024-01-17 DIAGNOSIS — B96.89 BACTERIAL VAGINITIS: ICD-10-CM

## 2024-01-17 DIAGNOSIS — N89.8 VAGINAL ODOR: ICD-10-CM

## 2024-01-17 PROCEDURE — G8419 CALC BMI OUT NRM PARAM NOF/U: HCPCS | Performed by: ADVANCED PRACTICE MIDWIFE

## 2024-01-17 PROCEDURE — G8484 FLU IMMUNIZE NO ADMIN: HCPCS | Performed by: ADVANCED PRACTICE MIDWIFE

## 2024-01-17 PROCEDURE — 99213 OFFICE O/P EST LOW 20 MIN: CPT | Performed by: ADVANCED PRACTICE MIDWIFE

## 2024-01-17 PROCEDURE — G8427 DOCREV CUR MEDS BY ELIG CLIN: HCPCS | Performed by: ADVANCED PRACTICE MIDWIFE

## 2024-01-17 PROCEDURE — 1036F TOBACCO NON-USER: CPT | Performed by: ADVANCED PRACTICE MIDWIFE

## 2024-01-17 RX ORDER — METRONIDAZOLE 500 MG/1
500 TABLET ORAL 2 TIMES DAILY
Qty: 14 TABLET | Refills: 1 | Status: SHIPPED | OUTPATIENT
Start: 2024-01-17 | End: 2024-04-16

## 2024-01-17 ASSESSMENT — ENCOUNTER SYMPTOMS
DIARRHEA: 0
SHORTNESS OF BREATH: 0
CONSTIPATION: 0
NAUSEA: 0
COUGH: 0
VOMITING: 0
ABDOMINAL PAIN: 0

## 2024-01-17 NOTE — PROGRESS NOTES
SUBJECTIVE:  Danilo Mcghee is a 18 y.o. female who presents here today for complaints of:      Chief Complaint   Patient presents with    Vaginal Odor     Vaginitis   Experiencing increased vaginal odor without itching/irritation without increased discharge    Review of Systems   Respiratory:  Negative for cough and shortness of breath.    Gastrointestinal:  Negative for abdominal pain, constipation, diarrhea, nausea and vomiting.   Genitourinary:  Negative for difficulty urinating, dysuria, menstrual problem, pelvic pain, vaginal bleeding and vaginal discharge.   All other systems reviewed and are negative.    OBJECTIVE:  Vitals:  /74   Pulse 75   Wt 79.4 kg (175 lb)   LMP 08/21/2023   BMI 28.25 kg/m²     Physical Exam  Appearance:  Normal appearance  Cardiovascular:  Normal rate, Capillary refill less than 2 seconds  Pulmonary:  Normal effort, no distress  Abdominal:  No tenderness  MS:  No Swelling, No dependent edema  Skin:  Warm, dry  Neuro:  Alert and oriented x3, reflexes normal.  Psychiatric:  Normal mood and behavior    ASSESSMENT & PLAN:   Diagnosis Orders   1. Vaginal odor  C.trachomatis N.gonorrhoeae DNA    Wet prep, genital      2. Bacterial vaginitis  metroNIDAZOLE (FLAGYL) 500 MG tablet          Vaginal Odor  Vaginal cultures collected  Vaginitis, Bacterial - Rx for Flagyl    No follow-ups on file.    Agnes Ibarra, ANT - KRYSTA

## 2024-01-18 LAB
CLUE CELLS VAG QL WET PREP: NORMAL
T VAGINALIS VAG QL WET PREP: NORMAL
TRICHOMONAS VAGINALIS SCREEN: NEGATIVE
YEAST VAG QL WET PREP: NORMAL

## 2024-01-19 LAB
C TRACH DNA CVX QL NAA+PROBE: NEGATIVE
N GONORRHOEA DNA CERV MUCUS QL NAA+PROBE: NEGATIVE

## 2024-02-15 ENCOUNTER — OFFICE VISIT (OUTPATIENT)
Dept: OBGYN CLINIC | Age: 19
End: 2024-02-15
Payer: COMMERCIAL

## 2024-02-15 VITALS
HEART RATE: 80 BPM | WEIGHT: 175 LBS | DIASTOLIC BLOOD PRESSURE: 72 MMHG | BODY MASS INDEX: 28.25 KG/M2 | SYSTOLIC BLOOD PRESSURE: 118 MMHG

## 2024-02-15 DIAGNOSIS — N89.8 VAGINAL DISCHARGE: ICD-10-CM

## 2024-02-15 DIAGNOSIS — B96.89 BACTERIAL VAGINITIS: ICD-10-CM

## 2024-02-15 DIAGNOSIS — N89.8 VAGINAL DISCHARGE: Primary | ICD-10-CM

## 2024-02-15 DIAGNOSIS — N89.8 VAGINAL ODOR: ICD-10-CM

## 2024-02-15 DIAGNOSIS — N76.0 BACTERIAL VAGINITIS: ICD-10-CM

## 2024-02-15 PROCEDURE — G8484 FLU IMMUNIZE NO ADMIN: HCPCS | Performed by: ADVANCED PRACTICE MIDWIFE

## 2024-02-15 PROCEDURE — 1036F TOBACCO NON-USER: CPT | Performed by: ADVANCED PRACTICE MIDWIFE

## 2024-02-15 PROCEDURE — G8427 DOCREV CUR MEDS BY ELIG CLIN: HCPCS | Performed by: ADVANCED PRACTICE MIDWIFE

## 2024-02-15 PROCEDURE — G8419 CALC BMI OUT NRM PARAM NOF/U: HCPCS | Performed by: ADVANCED PRACTICE MIDWIFE

## 2024-02-15 PROCEDURE — 99213 OFFICE O/P EST LOW 20 MIN: CPT | Performed by: ADVANCED PRACTICE MIDWIFE

## 2024-02-15 RX ORDER — TINIDAZOLE 500 MG/1
1 TABLET ORAL
Qty: 10 TABLET | Refills: 0 | Status: SHIPPED | OUTPATIENT
Start: 2024-02-15 | End: 2024-02-20

## 2024-02-15 NOTE — PROGRESS NOTES
SUBJECTIVE:  Danilo Mcghee is a 18 y.o. female who presents here today for complaints of:      Chief Complaint   Patient presents with    Vaginal Discharge     Pt has a thick white discharge, odor,  and some burning with urination     Vaginitis   Experiencing increased vaginal odor increased vaginal discharge without itching/irritation.  She was recently treated for bacterial vaginosis on 1/17/24, symptoms did not improve with antibiotic therapy and remain present today.  She would like to try an alternative treatment option since Flagyl did not work previously.    Review of Systems   Respiratory:  Negative for cough and shortness of breath.    Gastrointestinal:  Negative for abdominal pain, constipation, diarrhea, nausea and vomiting.   Genitourinary:  Positive for vaginal discharge. Negative for difficulty urinating, dysuria, menstrual problem, pelvic pain and vaginal bleeding.   All other systems reviewed and are negative.    OBJECTIVE:  Vitals:  /72   Pulse 80   Wt 79.4 kg (175 lb)   LMP 08/21/2023   Breastfeeding No   BMI 28.25 kg/m²     Physical Exam  Appearance:  Normal appearance  Cardiovascular:  Normal rate, Capillary refill less than 2 seconds  Pulmonary:  Normal effort, no distress  Abdominal:  No tenderness  MS:  No Swelling, No dependent edema  Skin:  Warm, dry  Neuro:  Alert and oriented x3, reflexes normal.  Psychiatric:  Normal mood and behavior    ASSESSMENT & PLAN:   Diagnosis Orders   1. Vaginal discharge  C.trachomatis N.gonorrhoeae DNA    Wet prep, genital      2. Vaginal odor        3. Bacterial vaginitis  tinidazole (TINDAMAX) 500 MG tablet          Vaginal Discharge, Odor  Vaginal cultures collected  Vaginitis, Bacterial - Rx for Tindamax    Return if symptoms worsen or fail to improve.    Agnes Ibarra, ANT - KRYSTA

## 2024-02-16 LAB
CLUE CELLS VAG QL WET PREP: ABNORMAL
T VAGINALIS VAG QL WET PREP: ABNORMAL
TRICHOMONAS VAGINALIS SCREEN: NEGATIVE
YEAST VAG QL WET PREP: ABNORMAL

## 2024-02-21 LAB
C TRACH DNA CVX QL NAA+PROBE: NEGATIVE
N GONORRHOEA DNA CERV MUCUS QL NAA+PROBE: NEGATIVE

## 2024-03-11 ENCOUNTER — TELEPHONE (OUTPATIENT)
Dept: OBGYN CLINIC | Age: 19
End: 2024-03-11

## 2024-03-11 DIAGNOSIS — N76.0 BACTERIAL VAGINITIS: ICD-10-CM

## 2024-03-11 DIAGNOSIS — B96.89 BACTERIAL VAGINITIS: ICD-10-CM

## 2024-03-11 NOTE — TELEPHONE ENCOUNTER
Pt would [facility]

## 2024-03-13 NOTE — TELEPHONE ENCOUNTER
I'm getting a warning that she is pregnant.  Can you confirm if she is pregnant or not as Tindamax should not be taken during pregnancy.

## 2024-03-14 RX ORDER — TINIDAZOLE 500 MG/1
1 TABLET ORAL
Qty: 10 TABLET | Refills: 0 | Status: SHIPPED | OUTPATIENT
Start: 2024-03-14 | End: 2024-03-19

## 2024-04-05 DIAGNOSIS — N76.0 BACTERIAL VAGINITIS: ICD-10-CM

## 2024-04-05 DIAGNOSIS — B96.89 BACTERIAL VAGINITIS: ICD-10-CM

## 2024-04-09 RX ORDER — TINIDAZOLE 500 MG/1
TABLET ORAL
Qty: 10 TABLET | Refills: 0 | Status: SHIPPED | OUTPATIENT
Start: 2024-04-09

## 2024-04-11 PROCEDURE — 96374 THER/PROPH/DIAG INJ IV PUSH: CPT

## 2024-04-11 PROCEDURE — 96375 TX/PRO/DX INJ NEW DRUG ADDON: CPT

## 2024-04-11 PROCEDURE — 99284 EMERGENCY DEPT VISIT MOD MDM: CPT

## 2024-04-11 ASSESSMENT — PAIN SCALES - GENERAL: PAINLEVEL_OUTOF10: 10

## 2024-04-11 ASSESSMENT — PAIN DESCRIPTION - DESCRIPTORS: DESCRIPTORS: SHARP

## 2024-04-11 ASSESSMENT — PAIN DESCRIPTION - LOCATION: LOCATION: ABDOMEN;FLANK

## 2024-04-11 ASSESSMENT — PAIN DESCRIPTION - PAIN TYPE: TYPE: ACUTE PAIN

## 2024-04-11 ASSESSMENT — PAIN DESCRIPTION - ORIENTATION: ORIENTATION: RIGHT

## 2024-04-11 ASSESSMENT — PAIN - FUNCTIONAL ASSESSMENT: PAIN_FUNCTIONAL_ASSESSMENT: 0-10

## 2024-04-12 ENCOUNTER — HOSPITAL ENCOUNTER (EMERGENCY)
Age: 19
Discharge: HOME | End: 2024-04-12
Attending: EMERGENCY MEDICINE
Payer: COMMERCIAL

## 2024-04-12 ENCOUNTER — APPOINTMENT (OUTPATIENT)
Dept: CT IMAGING | Age: 19
End: 2024-04-12
Payer: COMMERCIAL

## 2024-04-12 VITALS
HEIGHT: 66 IN | DIASTOLIC BLOOD PRESSURE: 84 MMHG | TEMPERATURE: 98.7 F | WEIGHT: 170 LBS | SYSTOLIC BLOOD PRESSURE: 120 MMHG | BODY MASS INDEX: 27.32 KG/M2 | OXYGEN SATURATION: 99 % | HEART RATE: 89 BPM | RESPIRATION RATE: 18 BRPM

## 2024-04-12 DIAGNOSIS — R10.9 ABDOMINAL PAIN, VOMITING, AND DIARRHEA: Primary | ICD-10-CM

## 2024-04-12 DIAGNOSIS — M54.32 SCIATICA OF LEFT SIDE: ICD-10-CM

## 2024-04-12 DIAGNOSIS — R11.10 ABDOMINAL PAIN, VOMITING, AND DIARRHEA: Primary | ICD-10-CM

## 2024-04-12 DIAGNOSIS — R19.7 ABDOMINAL PAIN, VOMITING, AND DIARRHEA: Primary | ICD-10-CM

## 2024-04-12 DIAGNOSIS — N39.0 URINARY TRACT INFECTION WITHOUT HEMATURIA, SITE UNSPECIFIED: ICD-10-CM

## 2024-04-12 LAB
ALBUMIN SERPL-MCNC: 4.2 G/DL (ref 3.5–4.6)
ALP SERPL-CCNC: 64 U/L (ref 40–130)
ALT SERPL-CCNC: 7 U/L (ref 0–33)
ANION GAP SERPL CALCULATED.3IONS-SCNC: 10 MEQ/L (ref 9–15)
AST SERPL-CCNC: 11 U/L (ref 0–35)
BACTERIA URNS QL MICRO: ABNORMAL /HPF
BASOPHILS # BLD: 0 K/UL (ref 0–0.2)
BASOPHILS NFR BLD: 0.3 %
BILIRUB SERPL-MCNC: 0.8 MG/DL (ref 0.2–0.7)
BILIRUB UR QL STRIP: NEGATIVE
BUN SERPL-MCNC: 8 MG/DL (ref 6–20)
CALCIUM SERPL-MCNC: 9.1 MG/DL (ref 8.5–9.9)
CHLORIDE SERPL-SCNC: 101 MEQ/L (ref 95–107)
CLARITY UR: CLEAR
CO2 SERPL-SCNC: 25 MEQ/L (ref 20–31)
COLOR UR: YELLOW
CREAT SERPL-MCNC: 0.67 MG/DL (ref 0.5–0.9)
EOSINOPHIL # BLD: 0 K/UL (ref 0–0.7)
EOSINOPHIL NFR BLD: 0.2 %
EPI CELLS #/AREA URNS AUTO: ABNORMAL /HPF (ref 0–5)
ERYTHROCYTE [DISTWIDTH] IN BLOOD BY AUTOMATED COUNT: 13.6 % (ref 11.5–14.5)
GLOBULIN SER CALC-MCNC: 2.8 G/DL (ref 2.3–3.5)
GLUCOSE SERPL-MCNC: 90 MG/DL (ref 70–99)
GLUCOSE UR STRIP-MCNC: NEGATIVE MG/DL
HCG SERPL QL: NEGATIVE
HCT VFR BLD AUTO: 40.1 % (ref 37–47)
HGB BLD-MCNC: 13.2 G/DL (ref 12–16)
HGB UR QL STRIP: NEGATIVE
HYALINE CASTS #/AREA URNS AUTO: ABNORMAL /HPF (ref 0–5)
KETONES UR STRIP-MCNC: >=80 MG/DL
LEUKOCYTE ESTERASE UR QL STRIP: ABNORMAL
LIPASE SERPL-CCNC: 31 U/L (ref 12–95)
LYMPHOCYTES # BLD: 0.6 K/UL (ref 1–4.8)
LYMPHOCYTES NFR BLD: 9.2 %
MCH RBC QN AUTO: 29.7 PG (ref 27–31.3)
MCHC RBC AUTO-ENTMCNC: 32.9 % (ref 33–37)
MCV RBC AUTO: 90.1 FL (ref 79.4–94.8)
MONOCYTES # BLD: 0.4 K/UL (ref 0.2–0.8)
MONOCYTES NFR BLD: 7.1 %
NEUTROPHILS # BLD: 5.1 K/UL (ref 1.4–6.5)
NEUTS SEG NFR BLD: 82.9 %
NITRITE UR QL STRIP: NEGATIVE
PH UR STRIP: 6 [PH] (ref 5–9)
PLATELET # BLD AUTO: 259 K/UL (ref 130–400)
POTASSIUM SERPL-SCNC: 3.7 MEQ/L (ref 3.4–4.9)
PROT SERPL-MCNC: 7 G/DL (ref 6.3–8)
PROT UR STRIP-MCNC: NEGATIVE MG/DL
RBC # BLD AUTO: 4.45 M/UL (ref 4.2–5.4)
RBC #/AREA URNS AUTO: ABNORMAL /HPF (ref 0–5)
SODIUM SERPL-SCNC: 136 MEQ/L (ref 135–144)
SP GR UR STRIP: 1.02 (ref 1–1.03)
URINE REFLEX TO CULTURE: YES
UROBILINOGEN UR STRIP-ACNC: 1 E.U./DL
WBC # BLD AUTO: 6.2 K/UL (ref 4.5–11)
WBC #/AREA URNS AUTO: ABNORMAL /HPF (ref 0–5)

## 2024-04-12 PROCEDURE — 36415 COLL VENOUS BLD VENIPUNCTURE: CPT

## 2024-04-12 PROCEDURE — 85025 COMPLETE CBC W/AUTO DIFF WBC: CPT

## 2024-04-12 PROCEDURE — 74176 CT ABD & PELVIS W/O CONTRAST: CPT

## 2024-04-12 PROCEDURE — 96375 TX/PRO/DX INJ NEW DRUG ADDON: CPT

## 2024-04-12 PROCEDURE — 6370000000 HC RX 637 (ALT 250 FOR IP): Performed by: EMERGENCY MEDICINE

## 2024-04-12 PROCEDURE — 87186 SC STD MICRODIL/AGAR DIL: CPT

## 2024-04-12 PROCEDURE — 2580000003 HC RX 258: Performed by: EMERGENCY MEDICINE

## 2024-04-12 PROCEDURE — 96374 THER/PROPH/DIAG INJ IV PUSH: CPT

## 2024-04-12 PROCEDURE — 80053 COMPREHEN METABOLIC PANEL: CPT

## 2024-04-12 PROCEDURE — 81001 URINALYSIS AUTO W/SCOPE: CPT

## 2024-04-12 PROCEDURE — A4216 STERILE WATER/SALINE, 10 ML: HCPCS | Performed by: EMERGENCY MEDICINE

## 2024-04-12 PROCEDURE — 86403 PARTICLE AGGLUT ANTBDY SCRN: CPT

## 2024-04-12 PROCEDURE — 84703 CHORIONIC GONADOTROPIN ASSAY: CPT

## 2024-04-12 PROCEDURE — 2500000003 HC RX 250 WO HCPCS: Performed by: EMERGENCY MEDICINE

## 2024-04-12 PROCEDURE — 87077 CULTURE AEROBIC IDENTIFY: CPT

## 2024-04-12 PROCEDURE — 83690 ASSAY OF LIPASE: CPT

## 2024-04-12 PROCEDURE — 87086 URINE CULTURE/COLONY COUNT: CPT

## 2024-04-12 PROCEDURE — 6360000002 HC RX W HCPCS: Performed by: EMERGENCY MEDICINE

## 2024-04-12 RX ORDER — DICYCLOMINE HYDROCHLORIDE 10 MG/1
20 CAPSULE ORAL ONCE
Status: COMPLETED | OUTPATIENT
Start: 2024-04-12 | End: 2024-04-12

## 2024-04-12 RX ORDER — ONDANSETRON 2 MG/ML
4 INJECTION INTRAMUSCULAR; INTRAVENOUS ONCE
Status: COMPLETED | OUTPATIENT
Start: 2024-04-12 | End: 2024-04-12

## 2024-04-12 RX ORDER — 0.9 % SODIUM CHLORIDE 0.9 %
1000 INTRAVENOUS SOLUTION INTRAVENOUS ONCE
Status: COMPLETED | OUTPATIENT
Start: 2024-04-12 | End: 2024-04-12

## 2024-04-12 RX ORDER — CEFDINIR 300 MG/1
300 CAPSULE ORAL 2 TIMES DAILY
Qty: 14 CAPSULE | Refills: 0 | Status: SHIPPED | OUTPATIENT
Start: 2024-04-12 | End: 2024-04-19

## 2024-04-12 RX ORDER — DICYCLOMINE HCL 20 MG
20 TABLET ORAL 4 TIMES DAILY
Qty: 20 TABLET | Refills: 0 | Status: SHIPPED | OUTPATIENT
Start: 2024-04-12

## 2024-04-12 RX ORDER — ONDANSETRON 4 MG/1
4 TABLET, ORALLY DISINTEGRATING ORAL 3 TIMES DAILY PRN
Qty: 15 TABLET | Refills: 0 | Status: SHIPPED | OUTPATIENT
Start: 2024-04-12

## 2024-04-12 RX ORDER — MORPHINE SULFATE 4 MG/ML
4 INJECTION, SOLUTION INTRAMUSCULAR; INTRAVENOUS ONCE
Status: COMPLETED | OUTPATIENT
Start: 2024-04-12 | End: 2024-04-12

## 2024-04-12 RX ADMIN — ONDANSETRON 4 MG: 2 INJECTION INTRAMUSCULAR; INTRAVENOUS at 00:43

## 2024-04-12 RX ADMIN — MORPHINE SULFATE 4 MG: 4 INJECTION, SOLUTION INTRAMUSCULAR; INTRAVENOUS at 01:52

## 2024-04-12 RX ADMIN — SODIUM CHLORIDE 1000 ML: 9 INJECTION, SOLUTION INTRAVENOUS at 00:44

## 2024-04-12 RX ADMIN — SODIUM CHLORIDE 1000 ML: 9 INJECTION, SOLUTION INTRAVENOUS at 01:54

## 2024-04-12 RX ADMIN — FAMOTIDINE 20 MG: 10 INJECTION, SOLUTION INTRAVENOUS at 01:52

## 2024-04-12 RX ADMIN — DICYCLOMINE HYDROCHLORIDE 20 MG: 10 CAPSULE ORAL at 04:14

## 2024-04-12 ASSESSMENT — ENCOUNTER SYMPTOMS
EYE DISCHARGE: 0
EYE REDNESS: 0
DIARRHEA: 1
SORE THROAT: 0
CHOKING: 0
COLOR CHANGE: 0
CHEST TIGHTNESS: 0
CONSTIPATION: 0
SHORTNESS OF BREATH: 0
BACK PAIN: 0
ABDOMINAL DISTENTION: 1
ALLERGIC/IMMUNOLOGIC NEGATIVE: 1
RHINORRHEA: 0
NAUSEA: 1
FACIAL SWELLING: 0
PHOTOPHOBIA: 0
COUGH: 0
ABDOMINAL PAIN: 1
VOMITING: 1

## 2024-04-12 ASSESSMENT — PAIN SCALES - GENERAL
PAINLEVEL_OUTOF10: 8
PAINLEVEL_OUTOF10: 7

## 2024-04-12 ASSESSMENT — LIFESTYLE VARIABLES
HOW OFTEN DO YOU HAVE A DRINK CONTAINING ALCOHOL: NEVER
HOW MANY STANDARD DRINKS CONTAINING ALCOHOL DO YOU HAVE ON A TYPICAL DAY: PATIENT DOES NOT DRINK

## 2024-04-12 ASSESSMENT — PAIN DESCRIPTION - LOCATION: LOCATION: ABDOMEN

## 2024-04-12 ASSESSMENT — PAIN DESCRIPTION - DESCRIPTORS: DESCRIPTORS: ACHING

## 2024-04-12 NOTE — DISCHARGE INSTRUCTIONS
CT ABDOMEN PELVIS WO CONTRAST Additional Contrast? None   Final Result   No clear cause for epigastric pain.      Bladder wall thickening. Correlation with UA suggested to exclude cystitis.      Trace free fluid in the pelvis likely physiologic.           Labs Reviewed   CBC WITH AUTO DIFFERENTIAL - Abnormal; Notable for the following components:       Result Value    MCHC 32.9 (*)     Lymphocytes Absolute 0.6 (*)     All other components within normal limits   COMPREHENSIVE METABOLIC PANEL - Abnormal; Notable for the following components:    Total Bilirubin 0.8 (*)     All other components within normal limits   URINALYSIS WITH REFLEX TO CULTURE - Abnormal; Notable for the following components:    Ketones, Urine >=80 (*)     Leukocyte Esterase, Urine SMALL (*)     All other components within normal limits   MICROSCOPIC URINALYSIS - Abnormal; Notable for the following components:    Bacteria, UA MODERATE (*)     WBC, UA 10-20 (*)     All other components within normal limits   CULTURE, URINE   LIPASE   HCG, SERUM, QUALITATIVE

## 2024-04-12 NOTE — ED PROVIDER NOTES
[facility] ED  EMERGENCY DEPARTMENT ENCOUNTER      Pt Name: Danilo Mcghee  MRN: [id number]  Birthdate 2005  Date of evaluation: 2024  Provider: Sylvie Hernandez MD  8:52 AM    CHIEF COMPLAINT       Chief Complaint   Patient presents with    Abdominal Pain     RT flank into abdomen with diarrhea and nausea today    Nausea    Diarrhea         HISTORY OF PRESENT ILLNESS    Danilo Mcghee is a 18 y.o. female who presents to the emergency department w    Pt is a 19 yo female  with Patient's last menstrual period was 2024 who presents with bilateral lower quadrant as well as epigastric abdominal pain which has been ongoing since 8 PM last night (24 hours ago) and patient has had 4 episodes of nausea vomiting as well as 4 episodes of nonbloody diarrhea with a fever up to 102 °F yesterday.  Patient notes she has had limited eating and drinking secondary to the pain and now is complaining of 8 out of 10 epigastric abdominal pain which she describes as similar to an epidural that she had with her previous pregnancy.  She has had no reported history of cough, congestion, chest pain, shortness of breath and no reported history of dysuria, frequency or urgency.  According to patient, she states that pain is worse with eating.                 Nursing Notes were reviewed.    REVIEW OF SYSTEMS       Review of Systems   Constitutional:  Positive for activity change and appetite change. Negative for chills, fatigue and fever.   HENT:  Negative for congestion, ear pain, facial swelling, rhinorrhea, sore throat and tinnitus.    Eyes:  Negative for photophobia, discharge, redness and visual disturbance.   Respiratory:  Negative for cough, choking, chest tightness and shortness of breath.    Cardiovascular:  Negative for chest pain, palpitations and leg swelling.   Gastrointestinal:  Positive for abdominal distention, abdominal pain, diarrhea, nausea and vomiting. Negative for constipation.   Endocrine:  gastroenteritis, I have written for home antibiotics.  Patient was given a copy of her CT findings.  She was written for dicyclomine here in the emergency department and will discharged home with the same as well as Zofran and cefdinir for urinary tract infection.  Pt has signs and symptoms consistent with left-sided sciatica but has no obvious signs of low back pain,  fecal or urinary incontinence to suggest cauda equina at this time patient was given abdominal pain precautions advised follow-up with emergency room for any worsening abdominal pain, nausea, vomiting, fevers, blood in urine or stool or any other concerns.    Amount and/or Complexity of Data Reviewed  Labs: ordered.  Radiology: ordered.    Risk  Prescription drug management.            REASSESSMENT          CRITICAL CARE TIME   Total Critical Care time was 20 minutes, excluding separately reportable procedures.  There was a high probability of clinically significant/life threatening deterioration in the patient's condition which required my urgent intervention.      CONSULTS:  None    PROCEDURES:  Unless otherwise noted below, none       FINAL IMPRESSION      1. Abdominal pain, vomiting, and diarrhea    2. Sciatica of left side    3. Urinary tract infection without hematuria, site unspecified          DISPOSITION/PLAN   DISPOSITION Decision To Discharge 04/12/2024 03:55:04 AM      PATIENT REFERRED TO:  Gabino Ulloa MD  [address] [place], [place] [id number] [phone]    Call in 1 day      Gabino Ulloa MD  [address] [place], [place] [address] [phone]            DISCHARGE MEDICATIONS:  Discharge Medication List as of 4/12/2024  4:14 AM        START taking these medications    Details   ondansetron (ZOFRAN-ODT) 4 MG disintegrating tablet Take 1 tablet by mouth 3 times daily as needed for Nausea or Vomiting, Disp-15 tablet, R-0Normal      dicyclomine (BENTYL) 20 MG tablet Take 1 tablet by mouth 4 times daily, Disp-20 tablet, R-0Normal

## 2024-04-14 LAB
BACTERIA UR CULT: ABNORMAL
ORGANISM: ABNORMAL
ORGANISM: ABNORMAL

## 2024-04-30 PROBLEM — O36.5930 POOR FETAL GROWTH AFFECTING MANAGEMENT OF MOTHER IN THIRD TRIMESTER: Status: ACTIVE | Noted: 2023-01-17

## 2024-05-06 ENCOUNTER — HOSPITAL ENCOUNTER (EMERGENCY)
Age: 19
Discharge: HOME | End: 2024-05-06
Payer: COMMERCIAL

## 2024-05-06 VITALS
SYSTOLIC BLOOD PRESSURE: 122 MMHG | BODY MASS INDEX: 26.03 KG/M2 | WEIGHT: 162 LBS | HEART RATE: 87 BPM | RESPIRATION RATE: 18 BRPM | TEMPERATURE: 98.4 F | OXYGEN SATURATION: 96 % | DIASTOLIC BLOOD PRESSURE: 79 MMHG | HEIGHT: 66 IN

## 2024-05-06 DIAGNOSIS — B96.89 BACTERIAL VAGINOSIS: Primary | ICD-10-CM

## 2024-05-06 DIAGNOSIS — N76.0 BACTERIAL VAGINOSIS: Primary | ICD-10-CM

## 2024-05-06 LAB
BILIRUB UR QL STRIP: NEGATIVE
CLARITY UR: CLEAR
CLUE CELLS VAG QL WET PREP: ABNORMAL
COLOR UR: YELLOW
GLUCOSE UR STRIP-MCNC: NEGATIVE MG/DL
HCG, URINE, POC: NEGATIVE
HGB UR QL STRIP: NEGATIVE
KETONES UR STRIP-MCNC: ABNORMAL MG/DL
LEUKOCYTE ESTERASE UR QL STRIP: NEGATIVE
Lab: NORMAL
NEGATIVE QC PASS/FAIL: NORMAL
NITRITE UR QL STRIP: NEGATIVE
PH UR STRIP: 7 [PH] (ref 5–9)
POSITIVE QC PASS/FAIL: NORMAL
PROT UR STRIP-MCNC: ABNORMAL MG/DL
SP GR UR STRIP: 1.03 (ref 1–1.03)
T VAGINALIS VAG QL WET PREP: ABNORMAL
TRICHOMONAS VAGINALIS SCREEN: NEGATIVE
URINE REFLEX TO CULTURE: ABNORMAL
UROBILINOGEN UR STRIP-ACNC: 1 E.U./DL
YEAST VAG QL WET PREP: ABNORMAL

## 2024-05-06 PROCEDURE — 99283 EMERGENCY DEPT VISIT LOW MDM: CPT

## 2024-05-06 PROCEDURE — 6370000000 HC RX 637 (ALT 250 FOR IP)

## 2024-05-06 PROCEDURE — 87210 SMEAR WET MOUNT SALINE/INK: CPT

## 2024-05-06 PROCEDURE — 81003 URINALYSIS AUTO W/O SCOPE: CPT

## 2024-05-06 PROCEDURE — 87808 TRICHOMONAS ASSAY W/OPTIC: CPT

## 2024-05-06 RX ORDER — METRONIDAZOLE 500 MG/1
500 TABLET ORAL ONCE
Status: COMPLETED | OUTPATIENT
Start: 2024-05-06 | End: 2024-05-06

## 2024-05-06 RX ORDER — METRONIDAZOLE 500 MG/1
500 TABLET ORAL 2 TIMES DAILY
Qty: 14 TABLET | Refills: 0 | Status: SHIPPED | OUTPATIENT
Start: 2024-05-06 | End: 2024-05-13

## 2024-05-06 RX ADMIN — METRONIDAZOLE 500 MG: 500 TABLET ORAL at 01:54

## 2024-05-06 ASSESSMENT — ENCOUNTER SYMPTOMS
NAUSEA: 0
VOMITING: 0
COUGH: 0
DIARRHEA: 0
SHORTNESS OF BREATH: 0
ABDOMINAL PAIN: 0
PHOTOPHOBIA: 0

## 2024-05-06 NOTE — ED PROVIDER NOTES
[facility] ED  EMERGENCY DEPARTMENT ENCOUNTER      Pt Name: Danilo Mcghee  MRN: [id number]  Birthdate 2005  Date of evaluation: 5/6/2024  Provider: EVELIN Mace  12:45 AM EDT      CHIEF COMPLAINT       Chief Complaint   Patient presents with    Vaginal Discharge     Started a few days prior, was seen for UTI 2 weeks prior         HISTORY OF PRESENT ILLNESS   (Location/Symptom, Timing/Onset, Context/Setting, Quality, Duration, Modifying Factors, Severity)  Note limiting factors.   Danilo Mcghee is a 18 y.o. female who presents to the emergency department for evaluation of vaginal discharge.  Started a few days ago.  States thin, off-white/light yellow color, foul smell associated.  Slight vaginal irritation.  No significant vaginal itching, no purulent discharge, no abnormal vaginal bleeding.  Patient denies concern for STI.  No concern for pregnancy.  No localized abdominal or pelvic pain.  No fever, chills, nausea, vomiting, flank pain, dysuria, materia, urinary urgency, urinary frequency.    HPI    Nursing Notes were reviewed.    REVIEW OF SYSTEMS    (2-9 systems for level 4, 10 or more for level 5)     Review of Systems   Constitutional:  Negative for chills and fever.   HENT:  Negative for congestion.    Eyes:  Negative for photophobia.   Respiratory:  Negative for cough and shortness of breath.    Cardiovascular:  Negative for chest pain.   Gastrointestinal:  Negative for abdominal pain, diarrhea, nausea and vomiting.   Genitourinary:  Positive for vaginal discharge. Negative for difficulty urinating, dysuria, flank pain, frequency, hematuria, pelvic pain, urgency, vaginal bleeding and vaginal pain.   Musculoskeletal:  Negative for myalgias.   Neurological:  Negative for headaches.   Psychiatric/Behavioral:  Negative for confusion.        Except as noted above the remainder of the review of systems was reviewed and negative.       PAST MEDICAL HISTORY   No past medical history on

## 2024-05-09 LAB
C TRACH DNA UR QL NAA+PROBE: NEGATIVE
N GONORRHOEA DNA UR QL NAA+PROBE: NEGATIVE

## 2024-08-02 ENCOUNTER — HOSPITAL ENCOUNTER (EMERGENCY)
Age: 19
Discharge: HOME | End: 2024-08-02
Payer: COMMERCIAL

## 2024-08-02 VITALS
WEIGHT: 160 LBS | TEMPERATURE: 98.4 F | DIASTOLIC BLOOD PRESSURE: 82 MMHG | HEART RATE: 76 BPM | BODY MASS INDEX: 25.71 KG/M2 | RESPIRATION RATE: 16 BRPM | OXYGEN SATURATION: 100 % | HEIGHT: 66 IN | SYSTOLIC BLOOD PRESSURE: 127 MMHG

## 2024-08-02 DIAGNOSIS — T23.151A SUPERFICIAL BURN OF PALM OF RIGHT HAND, INITIAL ENCOUNTER: Primary | ICD-10-CM

## 2024-08-02 PROCEDURE — 99283 EMERGENCY DEPT VISIT LOW MDM: CPT

## 2024-08-02 RX ORDER — GINSENG 100 MG
CAPSULE ORAL
Qty: 15 G | Refills: 0 | Status: SHIPPED | OUTPATIENT
Start: 2024-08-02

## 2024-08-02 RX ORDER — HYDROCODONE BITARTRATE AND ACETAMINOPHEN 5; 325 MG/1; MG/1
1 TABLET ORAL EVERY 6 HOURS PRN
Qty: 10 TABLET | Refills: 0 | Status: SHIPPED | OUTPATIENT
Start: 2024-08-02 | End: 2024-08-05

## 2024-08-02 RX ORDER — IBUPROFEN 600 MG/1
600 TABLET ORAL EVERY 8 HOURS PRN
Qty: 20 TABLET | Refills: 0 | Status: SHIPPED | OUTPATIENT
Start: 2024-08-02

## 2024-08-02 ASSESSMENT — PAIN - FUNCTIONAL ASSESSMENT
PAIN_FUNCTIONAL_ASSESSMENT: 0-10
PAIN_FUNCTIONAL_ASSESSMENT: NONE - DENIES PAIN

## 2024-08-02 ASSESSMENT — PAIN SCALES - GENERAL: PAINLEVEL_OUTOF10: 4

## 2024-08-02 ASSESSMENT — ENCOUNTER SYMPTOMS
SHORTNESS OF BREATH: 0
BACK PAIN: 0
ABDOMINAL PAIN: 0
COUGH: 0

## 2024-08-02 ASSESSMENT — PAIN DESCRIPTION - PAIN TYPE: TYPE: ACUTE PAIN

## 2024-08-02 NOTE — ED NOTES
D/C instructions given. Aware the rx was electronically sent to [facility] on [address], as well as time the next dose is due. Verbalized understanding. Denies any further complaints. Amb out with steady gait in no acute distress.
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
## 2024-08-11 ENCOUNTER — HOSPITAL ENCOUNTER (EMERGENCY)
Age: 19
Discharge: HOME | End: 2024-08-11
Payer: COMMERCIAL

## 2024-08-11 VITALS
OXYGEN SATURATION: 98 % | WEIGHT: 165 LBS | SYSTOLIC BLOOD PRESSURE: 121 MMHG | DIASTOLIC BLOOD PRESSURE: 64 MMHG | HEIGHT: 66 IN | RESPIRATION RATE: 20 BRPM | HEART RATE: 93 BPM | BODY MASS INDEX: 26.52 KG/M2 | TEMPERATURE: 98.8 F

## 2024-08-11 DIAGNOSIS — M79.641 RIGHT HAND PAIN: Primary | ICD-10-CM

## 2024-08-11 PROCEDURE — 99283 EMERGENCY DEPT VISIT LOW MDM: CPT

## 2024-08-11 RX ORDER — IBUPROFEN 600 MG/1
600 TABLET ORAL EVERY 6 HOURS PRN
Qty: 120 TABLET | Refills: 0 | Status: SHIPPED | OUTPATIENT
Start: 2024-08-11

## 2024-08-11 ASSESSMENT — PAIN DESCRIPTION - ORIENTATION: ORIENTATION: RIGHT

## 2024-08-11 ASSESSMENT — PAIN - FUNCTIONAL ASSESSMENT: PAIN_FUNCTIONAL_ASSESSMENT: 0-10

## 2024-08-11 ASSESSMENT — PAIN SCALES - GENERAL: PAINLEVEL_OUTOF10: 8

## 2024-08-11 ASSESSMENT — PAIN DESCRIPTION - LOCATION: LOCATION: HAND

## 2024-08-11 ASSESSMENT — PAIN DESCRIPTION - DESCRIPTORS: DESCRIPTORS: ACHING

## 2024-08-11 NOTE — DISCHARGE INSTR - COC
documented pain score (0-10 scale): Pain Level: 8  Last Weight:   Wt Readings from Last 1 Encounters:   24 74.8 kg (165 lb)     Mental Status:  {IP PT MENTAL STATUS:84270}    IV Access:  { JH IV ACCESS:325961301}    Nursing Mobility/ADLs:  Walking   {CHP DME ADLs:464330499}  Transfer  {CHP DME ADLs:630742889}  Bathing  {CHP DME ADLs:894940245}  Dressing  {CHP DME ADLs:929465540}  Toileting  {CHP DME ADLs:769861287}  Feeding  {CHP DME ADLs:272118983}  Med Admin  {CHP DME ADLs:906930917}  Med Delivery   { JH MED Delivery:868969646}    Wound Care Documentation and Therapy:  Incision Abdomen Lower;Medial (Active)   Number of days:         Elimination:  Continence:   Bowel: {YES / NO:}  Bladder: {YES / NO:}  Urinary Catheter: {Urinary Catheter:412651802}   Colostomy/Ileostomy/Ileal Conduit: {YES / NO:}       Date of Last BM: ***  No intake or output data in the 24 hours ending 24 1726  No intake/output data recorded.    Safety Concerns:     { JH Safety Concerns:239617053}    Impairments/Disabilities:      { JH Impairments/Disabilities:844060810}    Nutrition Therapy:  Current Nutrition Therapy:   { JH Diet List:529338893}    Routes of Feeding: {CHP DME Other Feedings:148369074}  Liquids: {Slp liquid thickness:47854}  Daily Fluid Restriction: {CHP DME Yes amt example:701838254}  Last Modified Barium Swallow with Video (Video Swallowing Test): {Done Not Done Date:204311656}    Treatments at the Time of Hospital Discharge:   Respiratory Treatments: ***  Oxygen Therapy:  {Therapy; copd oxygen:24314}  Ventilator:    { CC Vent List:965566022}    Rehab Therapies: {THERAPEUTIC INTERVENTION:6311349056}  Weight Bearing Status/Restrictions: {[facility] Weight Bearin}  Other Medical Equipment (for information only, NOT a DME order):  {EQUIPMENT:969446596}  Other Treatments: ***    Patient's personal belongings (please select all that are sent with patient):  {P DME
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
## 2024-08-11 NOTE — DISCHARGE INSTRUCTIONS
Use Tylenol, Motrin, and Voltaren gel as needed for pain.  Schedule a follow-up appointment with your PCP as discussed.  Return to the emergency department for any new or worsening symptoms.
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
## 2024-09-08 ENCOUNTER — HOSPITAL ENCOUNTER (EMERGENCY)
Age: 19
Discharge: HOME | End: 2024-09-08
Payer: COMMERCIAL

## 2024-09-08 VITALS
DIASTOLIC BLOOD PRESSURE: 72 MMHG | HEIGHT: 66 IN | TEMPERATURE: 98.1 F | SYSTOLIC BLOOD PRESSURE: 126 MMHG | HEART RATE: 80 BPM | BODY MASS INDEX: 26.52 KG/M2 | RESPIRATION RATE: 16 BRPM | OXYGEN SATURATION: 99 % | WEIGHT: 165 LBS

## 2024-09-08 DIAGNOSIS — N76.0 BACTERIAL VAGINOSIS: ICD-10-CM

## 2024-09-08 DIAGNOSIS — N30.01 ACUTE CYSTITIS WITH HEMATURIA: Primary | ICD-10-CM

## 2024-09-08 DIAGNOSIS — B96.89 BACTERIAL VAGINOSIS: ICD-10-CM

## 2024-09-08 LAB
BACTERIA URNS QL MICRO: ABNORMAL /HPF
BILIRUB UR QL STRIP: NEGATIVE
CLARITY UR: CLEAR
CLUE CELLS VAG QL WET PREP: ABNORMAL
COLOR UR: YELLOW
EPI CELLS #/AREA URNS AUTO: ABNORMAL /HPF (ref 0–5)
GLUCOSE UR STRIP-MCNC: NEGATIVE MG/DL
HCG, URINE, POC: NEGATIVE
HGB UR QL STRIP: NEGATIVE
HYALINE CASTS #/AREA URNS AUTO: ABNORMAL /HPF (ref 0–5)
KETONES UR STRIP-MCNC: NEGATIVE MG/DL
LEUKOCYTE ESTERASE UR QL STRIP: ABNORMAL
Lab: NORMAL
NEGATIVE QC PASS/FAIL: NORMAL
NITRITE UR QL STRIP: NEGATIVE
PH UR STRIP: 8 [PH] (ref 5–9)
POSITIVE QC PASS/FAIL: NORMAL
PROT UR STRIP-MCNC: NEGATIVE MG/DL
RBC #/AREA URNS AUTO: ABNORMAL /HPF (ref 0–5)
REASON FOR REJECTION: NORMAL
REJECTED TEST: NORMAL
SP GR UR STRIP: 1.01 (ref 1–1.03)
T VAGINALIS VAG QL WET PREP: ABNORMAL
TRICHOMONAS VAGINALIS SCREEN: NEGATIVE
URINE REFLEX TO CULTURE: YES
UROBILINOGEN UR STRIP-ACNC: 1 E.U./DL
WBC #/AREA URNS AUTO: ABNORMAL /HPF (ref 0–5)
YEAST VAG QL WET PREP: ABNORMAL

## 2024-09-08 PROCEDURE — 6370000000 HC RX 637 (ALT 250 FOR IP)

## 2024-09-08 PROCEDURE — 81001 URINALYSIS AUTO W/SCOPE: CPT

## 2024-09-08 PROCEDURE — 87210 SMEAR WET MOUNT SALINE/INK: CPT

## 2024-09-08 PROCEDURE — 87808 TRICHOMONAS ASSAY W/OPTIC: CPT

## 2024-09-08 PROCEDURE — 87077 CULTURE AEROBIC IDENTIFY: CPT

## 2024-09-08 PROCEDURE — 87086 URINE CULTURE/COLONY COUNT: CPT

## 2024-09-08 PROCEDURE — 87491 CHLMYD TRACH DNA AMP PROBE: CPT

## 2024-09-08 PROCEDURE — 87591 N.GONORRHOEAE DNA AMP PROB: CPT

## 2024-09-08 PROCEDURE — 87186 SC STD MICRODIL/AGAR DIL: CPT

## 2024-09-08 PROCEDURE — 86403 PARTICLE AGGLUT ANTBDY SCRN: CPT

## 2024-09-08 PROCEDURE — 99283 EMERGENCY DEPT VISIT LOW MDM: CPT

## 2024-09-08 PROCEDURE — 87661 TRICHOMONAS VAGINALIS AMPLIF: CPT

## 2024-09-08 RX ORDER — CEPHALEXIN 500 MG/1
500 CAPSULE ORAL 2 TIMES DAILY
Qty: 20 CAPSULE | Refills: 0 | Status: SHIPPED | OUTPATIENT
Start: 2024-09-08 | End: 2024-09-18

## 2024-09-08 RX ORDER — METRONIDAZOLE 500 MG/1
500 TABLET ORAL ONCE
Status: COMPLETED | OUTPATIENT
Start: 2024-09-08 | End: 2024-09-08

## 2024-09-08 RX ORDER — METRONIDAZOLE 500 MG/1
500 TABLET ORAL 2 TIMES DAILY
Qty: 14 TABLET | Refills: 0 | Status: SHIPPED | OUTPATIENT
Start: 2024-09-08 | End: 2024-09-15

## 2024-09-08 RX ORDER — CEPHALEXIN 500 MG/1
500 CAPSULE ORAL ONCE
Status: COMPLETED | OUTPATIENT
Start: 2024-09-08 | End: 2024-09-08

## 2024-09-08 RX ADMIN — METRONIDAZOLE 500 MG: 500 TABLET ORAL at 18:11

## 2024-09-08 RX ADMIN — CEPHALEXIN 500 MG: 500 CAPSULE ORAL at 18:11

## 2024-09-08 ASSESSMENT — PAIN DESCRIPTION - PAIN TYPE: TYPE: ACUTE PAIN

## 2024-09-08 ASSESSMENT — PAIN DESCRIPTION - ORIENTATION: ORIENTATION: LOWER

## 2024-09-08 ASSESSMENT — PAIN DESCRIPTION - DESCRIPTORS: DESCRIPTORS: CRAMPING

## 2024-09-08 ASSESSMENT — LIFESTYLE VARIABLES
HOW OFTEN DO YOU HAVE A DRINK CONTAINING ALCOHOL: MONTHLY OR LESS
HOW MANY STANDARD DRINKS CONTAINING ALCOHOL DO YOU HAVE ON A TYPICAL DAY: 1 OR 2

## 2024-09-08 ASSESSMENT — PAIN - FUNCTIONAL ASSESSMENT: PAIN_FUNCTIONAL_ASSESSMENT: 0-10

## 2024-09-08 ASSESSMENT — PAIN SCALES - GENERAL: PAINLEVEL_OUTOF10: 8

## 2024-09-08 ASSESSMENT — PAIN DESCRIPTION - LOCATION: LOCATION: ABDOMEN

## 2024-09-09 ENCOUNTER — APPOINTMENT (OUTPATIENT)
Dept: ORTHOPEDIC SURGERY | Facility: CLINIC | Age: 19
End: 2024-09-09
Payer: COMMERCIAL

## 2024-09-09 ASSESSMENT — ENCOUNTER SYMPTOMS: ABDOMINAL PAIN: 1

## 2024-09-10 LAB
BACTERIA UR CULT: ABNORMAL
ORGANISM: ABNORMAL
ORGANISM: ABNORMAL

## 2024-09-11 LAB
SPECIMEN SOURCE: NORMAL
T VAGINALIS RRNA SPEC QL NAA+PROBE: NEGATIVE

## 2024-09-12 LAB
C TRACH DNA UR QL NAA+PROBE: NEGATIVE
N GONORRHOEA DNA UR QL NAA+PROBE: NEGATIVE

## 2024-10-20 ENCOUNTER — APPOINTMENT (OUTPATIENT)
Dept: CT IMAGING | Age: 19
End: 2024-10-20
Payer: COMMERCIAL

## 2024-10-20 ENCOUNTER — HOSPITAL ENCOUNTER (EMERGENCY)
Age: 19
Discharge: HOME | End: 2024-10-20
Payer: COMMERCIAL

## 2024-10-20 VITALS
DIASTOLIC BLOOD PRESSURE: 75 MMHG | TEMPERATURE: 98 F | WEIGHT: 170 LBS | BODY MASS INDEX: 27.44 KG/M2 | OXYGEN SATURATION: 100 % | RESPIRATION RATE: 16 BRPM | SYSTOLIC BLOOD PRESSURE: 127 MMHG | HEART RATE: 89 BPM

## 2024-10-20 DIAGNOSIS — S09.90XA CLOSED HEAD INJURY, INITIAL ENCOUNTER: Primary | ICD-10-CM

## 2024-10-20 LAB
BILIRUB UR QL STRIP: NEGATIVE
CLARITY UR: CLEAR
CLUE CELLS VAG QL WET PREP: NORMAL
COLOR UR: YELLOW
GLUCOSE UR STRIP-MCNC: NEGATIVE MG/DL
HGB UR QL STRIP: NEGATIVE
KETONES UR STRIP-MCNC: NEGATIVE MG/DL
LEUKOCYTE ESTERASE UR QL STRIP: NEGATIVE
NITRITE UR QL STRIP: NEGATIVE
PH UR STRIP: 6 [PH] (ref 5–9)
PROT UR STRIP-MCNC: NEGATIVE MG/DL
SP GR UR STRIP: 1 (ref 1–1.03)
T VAGINALIS VAG QL WET PREP: NORMAL
TRICHOMONAS VAGINALIS SCREEN: NEGATIVE
URINE REFLEX TO CULTURE: NORMAL
UROBILINOGEN UR STRIP-ACNC: 0.2 E.U./DL
YEAST VAG QL WET PREP: NORMAL

## 2024-10-20 PROCEDURE — 6360000002 HC RX W HCPCS: Performed by: PHYSICIAN ASSISTANT

## 2024-10-20 PROCEDURE — 87808 TRICHOMONAS ASSAY W/OPTIC: CPT

## 2024-10-20 PROCEDURE — 99284 EMERGENCY DEPT VISIT MOD MDM: CPT

## 2024-10-20 PROCEDURE — 87210 SMEAR WET MOUNT SALINE/INK: CPT

## 2024-10-20 PROCEDURE — 70486 CT MAXILLOFACIAL W/O DYE: CPT

## 2024-10-20 PROCEDURE — 6370000000 HC RX 637 (ALT 250 FOR IP): Performed by: PHYSICIAN ASSISTANT

## 2024-10-20 PROCEDURE — 96372 THER/PROPH/DIAG INJ SC/IM: CPT

## 2024-10-20 PROCEDURE — 81003 URINALYSIS AUTO W/O SCOPE: CPT

## 2024-10-20 PROCEDURE — 72125 CT NECK SPINE W/O DYE: CPT

## 2024-10-20 PROCEDURE — 70450 CT HEAD/BRAIN W/O DYE: CPT

## 2024-10-20 RX ORDER — METRONIDAZOLE 500 MG/1
500 TABLET ORAL 2 TIMES DAILY
Qty: 14 TABLET | Refills: 0 | Status: SHIPPED | OUTPATIENT
Start: 2024-10-20 | End: 2024-10-27

## 2024-10-20 RX ORDER — KETOROLAC TROMETHAMINE 30 MG/ML
60 INJECTION, SOLUTION INTRAMUSCULAR; INTRAVENOUS ONCE
Status: COMPLETED | OUTPATIENT
Start: 2024-10-20 | End: 2024-10-20

## 2024-10-20 RX ORDER — ETODOLAC 400 MG/1
400 TABLET, FILM COATED ORAL 2 TIMES DAILY
Qty: 14 TABLET | Refills: 0 | Status: SHIPPED | OUTPATIENT
Start: 2024-10-20

## 2024-10-20 RX ORDER — METRONIDAZOLE 500 MG/1
500 TABLET ORAL ONCE
Status: COMPLETED | OUTPATIENT
Start: 2024-10-20 | End: 2024-10-20

## 2024-10-20 RX ADMIN — METRONIDAZOLE 500 MG: 500 TABLET ORAL at 22:22

## 2024-10-20 RX ADMIN — KETOROLAC TROMETHAMINE 60 MG: 30 INJECTION, SOLUTION INTRAMUSCULAR at 22:22

## 2024-10-20 ASSESSMENT — PAIN - FUNCTIONAL ASSESSMENT: PAIN_FUNCTIONAL_ASSESSMENT: 0-10

## 2024-10-20 ASSESSMENT — PAIN SCALES - GENERAL: PAINLEVEL_OUTOF10: 8

## 2024-10-20 ASSESSMENT — PAIN DESCRIPTION - PAIN TYPE: TYPE: ACUTE PAIN
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
## 2024-10-23 LAB
REASON FOR REJECTION: NORMAL
REJECTED TEST: NORMAL

## 2025-01-01 ENCOUNTER — HOSPITAL ENCOUNTER (EMERGENCY)
Facility: HOSPITAL | Age: 20
Discharge: HOME | End: 2025-01-01
Payer: COMMERCIAL

## 2025-01-01 VITALS
RESPIRATION RATE: 18 BRPM | SYSTOLIC BLOOD PRESSURE: 123 MMHG | TEMPERATURE: 97.7 F | DIASTOLIC BLOOD PRESSURE: 71 MMHG | HEART RATE: 97 BPM | BODY MASS INDEX: 27.32 KG/M2 | OXYGEN SATURATION: 98 % | HEIGHT: 66 IN | WEIGHT: 170 LBS

## 2025-01-01 DIAGNOSIS — Z20.828 EXPOSURE TO INFLUENZA: Primary | ICD-10-CM

## 2025-01-01 DIAGNOSIS — Z20.2 POSSIBLE EXPOSURE TO STD: ICD-10-CM

## 2025-01-01 LAB
FLUAV RNA RESP QL NAA+PROBE: NOT DETECTED
FLUBV RNA RESP QL NAA+PROBE: NOT DETECTED
HOLD SPECIMEN: NORMAL
RSV RNA RESP QL NAA+PROBE: NOT DETECTED
SARS-COV-2 RNA RESP QL NAA+PROBE: NOT DETECTED

## 2025-01-01 PROCEDURE — 87637 SARSCOV2&INF A&B&RSV AMP PRB: CPT | Performed by: PHYSICIAN ASSISTANT

## 2025-01-01 PROCEDURE — 87491 CHLMYD TRACH DNA AMP PROBE: CPT | Mod: ELYLAB | Performed by: PHYSICIAN ASSISTANT

## 2025-01-01 PROCEDURE — 99283 EMERGENCY DEPT VISIT LOW MDM: CPT

## 2025-01-01 ASSESSMENT — COLUMBIA-SUICIDE SEVERITY RATING SCALE - C-SSRS
2. HAVE YOU ACTUALLY HAD ANY THOUGHTS OF KILLING YOURSELF?: NO
1. IN THE PAST MONTH, HAVE YOU WISHED YOU WERE DEAD OR WISHED YOU COULD GO TO SLEEP AND NOT WAKE UP?: NO
6. HAVE YOU EVER DONE ANYTHING, STARTED TO DO ANYTHING, OR PREPARED TO DO ANYTHING TO END YOUR LIFE?: NO

## 2025-01-01 ASSESSMENT — PAIN SCALES - GENERAL: PAINLEVEL_OUTOF10: 0 - NO PAIN

## 2025-01-01 ASSESSMENT — LIFESTYLE VARIABLES
EVER HAD A DRINK FIRST THING IN THE MORNING TO STEADY YOUR NERVES TO GET RID OF A HANGOVER: NO
HAVE YOU EVER FELT YOU SHOULD CUT DOWN ON YOUR DRINKING: NO
EVER FELT BAD OR GUILTY ABOUT YOUR DRINKING: NO
HAVE PEOPLE ANNOYED YOU BY CRITICIZING YOUR DRINKING: NO
TOTAL SCORE: 0

## 2025-01-01 ASSESSMENT — PAIN - FUNCTIONAL ASSESSMENT: PAIN_FUNCTIONAL_ASSESSMENT: 0-10

## 2025-01-01 NOTE — ED PROVIDER NOTES
HPI   Chief Complaint   Patient presents with   • PT wants tested for covid, the flu and RSV because her chil         History provided by:  Patient   used: No      19-year-old female presents with wanting swab for COVID, influenza and RSV as her kids are sick.  She is here with her daughter who is positive for influenza A.  She also wants tested for STDs.    ROS negative unless otherwise stated in HPI        Patient History   Past Medical History:   Diagnosis Date   • Left lower quadrant pain 05/31/2019    Abdominal pain, LLQ   • Other specified health status     No pertinent past medical history   • Personal history of other diseases of the digestive system 08/02/2019    History of bloody stools   • Personal history of other diseases of the musculoskeletal system and connective tissue 05/28/2019    History of low back pain   • Personal history of other specified conditions 04/12/2019    History of dysuria   • Personal history of urinary (tract) infections 06/25/2019    History of urinary tract infection   • Right lower quadrant abdominal tenderness 06/25/2019    Right lower quadrant abdominal tenderness without rebound tenderness     Past Surgical History:   Procedure Laterality Date   • OTHER SURGICAL HISTORY  04/12/2019    Strabismus surgery     No family history on file.  Social History     Tobacco Use   • Smoking status: Not on file   • Smokeless tobacco: Not on file   Substance Use Topics   • Alcohol use: Not on file   • Drug use: Not on file       Physical Exam   ED Triage Vitals [01/01/25 1715]   Temperature Heart Rate Respirations BP   36.5 °C (97.7 °F) 97 18 123/71      Pulse Ox Temp src Heart Rate Source Patient Position   98 % -- -- --      BP Location FiO2 (%)     -- --       Physical Exam    General: alert, no distress, well nourished, talking in full and complete sentences  Skin: dry, intact, no rashes  Head: atraumatic  Eyes: EOMI, PERRLA, normal conjunctiva  Throat: no stridor,  no hot potato voice  Nose: nares patent  Mouth: mucous membranes moist  Respiratory: non labored breathing  Extremities: FROM X4  Neuro: A&Ox3  Psych: cooperative, appropriate mood      ED Course & MDM   Diagnoses as of 01/01/25 1903   Exposure to influenza   Possible exposure to STD                 No data recorded                           Labs Reviewed   SARS-COV-2 PCR - Normal       Result Value    Coronavirus 2019, PCR Not Detected      Narrative:     This assay has received FDA Emergency Use Authorization (EUA) and is only authorized for the duration of time that circumstances exist to justify the authorization of the emergency use of in vitro diagnostic tests for the detection of SARS-CoV-2 virus and/or diagnosis of COVID-19 infection under section 564(b)(1) of the Act, 21 U.S.C. 360bbb-3(b)(1). This assay is an in vitro diagnostic nucleic acid amplification test for the qualitative detection of SARS-CoV-2 from nasopharyngeal specimens and has been validated for use at [facility]. Negative results do not preclude COVID-19 infections and should not be used as the sole basis for diagnosis, treatment, or other management decisions.     INFLUENZA A AND B PCR - Normal    Flu A Result Not Detected      Flu B Result Not Detected      Narrative:     This assay is an in vitro diagnostic multiplex nucleic acid amplification test for the detection and discrimination of Influenza A & B from nasopharyngeal specimens, and has been validated for use at [facility]. Negative results do not preclude Influenza A/B infections, and should not be used as the sole basis for diagnosis, treatment, or other management decisions. If Influenza A/B and RSV PCR results are negative, testing for Parainfluenza virus, Adenovirus and Metapneumovirus is routinely performed for [facility] pediatric oncology and intensive care inpatients, and is available on other patients by placing an add-on request.    RSV PCR - Normal    RSV PCR Not Detected      Narrative:     This assay is an FDA-cleared, in vitro diagnostic nucleic acid amplification test for the detection of RSV from nasopharyngeal specimens, and has been validated for use at [facility]. Negative results do not preclude RSV infections, and should not be used as the sole basis for diagnosis, treatment, or other management decisions. If Influenza A/B and RSV PCR results are negative, testing for Parainfluenza virus, Adenovirus and Metapneumovirus is routinely performed for pediatric oncology and intensive care inpatients at [facility], and is available on other patients by placing an add-on request.       C. TRACHOMATIS + N. GONORRHOEAE, AMPLIFIED   STERILE CUP      No orders to display           Medical Decision Making  Chlamydia and gonorrhea pending.  Negative for COVID/RSV/influenza.  Afebrile, not tachycardic, not tachypneic, not hypoxic, tolerating PO, non toxic appearing and ambulating at baseline at discharge. Hemodynamically intact. Patient instructed on return precautions. All questions answered. Patient in agreement with treatment.      Procedure  Procedures     Yue Parson PA-C  01/01/25 8854

## 2025-01-02 LAB
C TRACH RRNA SPEC QL NAA+PROBE: NEGATIVE
N GONORRHOEA DNA SPEC QL PROBE+SIG AMP: NEGATIVE

## 2025-02-07 ENCOUNTER — HOSPITAL ENCOUNTER (EMERGENCY)
Age: 20
Discharge: HOME | End: 2025-02-07
Payer: COMMERCIAL

## 2025-02-07 VITALS
SYSTOLIC BLOOD PRESSURE: 137 MMHG | RESPIRATION RATE: 19 BRPM | WEIGHT: 170 LBS | DIASTOLIC BLOOD PRESSURE: 89 MMHG | HEART RATE: 95 BPM | OXYGEN SATURATION: 98 % | BODY MASS INDEX: 27.44 KG/M2 | TEMPERATURE: 99.3 F

## 2025-02-07 DIAGNOSIS — N89.8 VAGINAL DISCHARGE: Primary | ICD-10-CM

## 2025-02-07 DIAGNOSIS — R30.0 DYSURIA: ICD-10-CM

## 2025-02-07 LAB
BACTERIA URNS QL MICRO: NEGATIVE /HPF
BILIRUB UR QL STRIP: NEGATIVE
CLARITY UR: CLEAR
CLUE CELLS VAG QL WET PREP: NORMAL
COLOR UR: YELLOW
EPI CELLS #/AREA URNS AUTO: ABNORMAL /HPF (ref 0–5)
GLUCOSE UR STRIP-MCNC: NEGATIVE MG/DL
HCG, URINE, POC: NEGATIVE
HGB UR QL STRIP: ABNORMAL
HYALINE CASTS #/AREA URNS AUTO: ABNORMAL /HPF (ref 0–5)
KETONES UR STRIP-MCNC: 15 MG/DL
LEUKOCYTE ESTERASE UR QL STRIP: ABNORMAL
Lab: NORMAL
NEGATIVE QC PASS/FAIL: NORMAL
NITRITE UR QL STRIP: NEGATIVE
PH UR STRIP: 6 [PH] (ref 5–9)
POSITIVE QC PASS/FAIL: NORMAL
PROT UR STRIP-MCNC: NEGATIVE MG/DL
RBC #/AREA URNS AUTO: ABNORMAL /HPF (ref 0–5)
SP GR UR STRIP: 1.02 (ref 1–1.03)
T VAGINALIS VAG QL WET PREP: NORMAL
TRICHOMONAS VAGINALIS SCREEN: NEGATIVE
URINE REFLEX TO CULTURE: ABNORMAL
UROBILINOGEN UR STRIP-ACNC: 1 E.U./DL
WBC #/AREA URNS AUTO: ABNORMAL /HPF (ref 0–5)
YEAST VAG QL WET PREP: NORMAL

## 2025-02-07 PROCEDURE — 81001 URINALYSIS AUTO W/SCOPE: CPT

## 2025-02-07 PROCEDURE — 87210 SMEAR WET MOUNT SALINE/INK: CPT

## 2025-02-07 PROCEDURE — 96372 THER/PROPH/DIAG INJ SC/IM: CPT

## 2025-02-07 PROCEDURE — 87591 N.GONORRHOEAE DNA AMP PROB: CPT

## 2025-02-07 PROCEDURE — 99284 EMERGENCY DEPT VISIT MOD MDM: CPT

## 2025-02-07 PROCEDURE — 6360000002 HC RX W HCPCS

## 2025-02-07 PROCEDURE — 87491 CHLMYD TRACH DNA AMP PROBE: CPT

## 2025-02-07 PROCEDURE — 87808 TRICHOMONAS ASSAY W/OPTIC: CPT

## 2025-02-07 RX ORDER — KETOROLAC TROMETHAMINE 30 MG/ML
30 INJECTION, SOLUTION INTRAMUSCULAR; INTRAVENOUS ONCE
Status: COMPLETED | OUTPATIENT
Start: 2025-02-07 | End: 2025-02-07

## 2025-02-07 RX ADMIN — KETOROLAC TROMETHAMINE 30 MG: 30 INJECTION, SOLUTION INTRAMUSCULAR at 19:28

## 2025-02-07 ASSESSMENT — PAIN DESCRIPTION - DESCRIPTORS: DESCRIPTORS: ACHING;BURNING;TEARING

## 2025-02-07 ASSESSMENT — PAIN - FUNCTIONAL ASSESSMENT: PAIN_FUNCTIONAL_ASSESSMENT: ACTIVITIES ARE NOT PREVENTED

## 2025-02-07 ASSESSMENT — PAIN DESCRIPTION - LOCATION: LOCATION: VAGINA

## 2025-02-07 ASSESSMENT — PAIN SCALES - GENERAL: PAINLEVEL_OUTOF10: 10

## 2025-02-07 NOTE — ED TRIAGE NOTES
Pt sated that she had vaginal pain and discharge for the last 2 days. Pt stated that there is an odor and that the discharge it white. Pt stated that there is concern for STD

## 2025-02-07 NOTE — ED PROVIDER NOTES
[facility] EMERGENCY DEPARTMENT  EMERGENCY DEPARTMENT ENCOUNTER      Pt Name: Danilo Mcghee  MRN: [id number]  Birthdate 2005  Date of evaluation: 2025  Provider: Lisa Rojas PA-C      HISTORY OF PRESENT ILLNESS    Danilo Mcghee is a 19 y.o. female who presents to the Emergency Department with vaginal discharge and odor for the past 2 days.  Patient is concerned for STI.  Patient states the discharge is white.  Patient is also having vaginal pain and dysuria.  Patient states she is sexually active and does not use protection.  Patient denies fever, chills, nausea or vomiting.    REVIEW OF SYSTEMS       Review of Systems   Genitourinary:  Positive for dysuria, hematuria, pelvic pain, vaginal discharge and vaginal pain.     PAST MEDICAL HISTORY   History reviewed. No pertinent past medical history.      SURGICAL HISTORY       Past Surgical History:   Procedure Laterality Date     SECTION      FOOT SURGERY      FOOT SURGERY       cyst removal     CURRENT MEDICATIONS       Previous Medications    BACITRACIN 500 UNIT/GM OINTMENT    Apply topically 2 times daily.    DICLOFENAC SODIUM (VOLTAREN) 1 % GEL    Apply 4 g topically 4 times daily    DICYCLOMINE (BENTYL) 20 MG TABLET    Take 1 tablet by mouth 4 times daily    DOCUSATE SODIUM (COLACE) 100 MG CAPSULE    Take 1 capsule by mouth 2 times daily as needed for Constipation    DOXYCYCLINE HYCLATE (VIBRAMYCIN) 100 MG CAPSULE    Take 1 capsule by mouth 2 times daily    ETODOLAC (LODINE) 400 MG TABLET    Take 1 tablet by mouth 2 times daily    IBUPROFEN (IBU) 600 MG TABLET    Take 1 tablet by mouth every 6 hours as needed for Pain    ONDANSETRON (ZOFRAN) 4 MG TABLET    Take 1 tablet by mouth every 8 hours as needed for Nausea    ONDANSETRON (ZOFRAN-ODT) 4 MG DISINTEGRATING TABLET    Take 1 tablet by mouth 3 times daily as needed for Nausea or Vomiting    POLYETHYLENE GLYCOL (MIRALAX) 17 GM/SCOOP POWDER    Use once to twice daily as needed by

## 2025-02-08 NOTE — ED NOTES
D/C instructions to patient per Lisa/PAC.  Patient voiced understanding.  Patient is ambulatory from ED with no distress observed.

## 2025-02-11 ENCOUNTER — HOSPITAL ENCOUNTER (EMERGENCY)
Facility: HOSPITAL | Age: 20
Discharge: HOME | End: 2025-02-11
Attending: STUDENT IN AN ORGANIZED HEALTH CARE EDUCATION/TRAINING PROGRAM
Payer: COMMERCIAL

## 2025-02-11 VITALS
TEMPERATURE: 97.3 F | RESPIRATION RATE: 18 BRPM | SYSTOLIC BLOOD PRESSURE: 118 MMHG | HEIGHT: 66 IN | WEIGHT: 170.86 LBS | DIASTOLIC BLOOD PRESSURE: 61 MMHG | BODY MASS INDEX: 27.46 KG/M2 | HEART RATE: 80 BPM | OXYGEN SATURATION: 99 %

## 2025-02-11 DIAGNOSIS — B00.9 HSV INFECTION: Primary | ICD-10-CM

## 2025-02-11 DIAGNOSIS — L03.818 CELLULITIS OF OTHER SPECIFIED SITE: ICD-10-CM

## 2025-02-11 LAB
APPEARANCE UR: ABNORMAL
BILIRUB UR STRIP.AUTO-MCNC: NEGATIVE MG/DL
C TRACH DNA CVX QL NAA+PROBE: NEGATIVE
COLOR UR: ABNORMAL
GLUCOSE UR STRIP.AUTO-MCNC: NORMAL MG/DL
HCG UR QL IA.RAPID: NEGATIVE
KETONES UR STRIP.AUTO-MCNC: NEGATIVE MG/DL
LEUKOCYTE ESTERASE UR QL STRIP.AUTO: ABNORMAL
MUCOUS THREADS #/AREA URNS AUTO: ABNORMAL /LPF
N GONORRHOEA DNA CERV MUCUS QL NAA+PROBE: NEGATIVE
NITRITE UR QL STRIP.AUTO: NEGATIVE
PH UR STRIP.AUTO: 6.5 [PH]
PROT UR STRIP.AUTO-MCNC: ABNORMAL MG/DL
RBC # UR STRIP.AUTO: ABNORMAL MG/DL
RBC #/AREA URNS AUTO: >20 /HPF
SP GR UR STRIP.AUTO: 1.02
SQUAMOUS #/AREA URNS AUTO: ABNORMAL /HPF
UROBILINOGEN UR STRIP.AUTO-MCNC: ABNORMAL MG/DL
WBC #/AREA URNS AUTO: >50 /HPF

## 2025-02-11 PROCEDURE — 87086 URINE CULTURE/COLONY COUNT: CPT | Mod: ELYLAB | Performed by: STUDENT IN AN ORGANIZED HEALTH CARE EDUCATION/TRAINING PROGRAM

## 2025-02-11 PROCEDURE — 87205 SMEAR GRAM STAIN: CPT | Mod: ELYLAB | Performed by: STUDENT IN AN ORGANIZED HEALTH CARE EDUCATION/TRAINING PROGRAM

## 2025-02-11 PROCEDURE — 2500000001 HC RX 250 WO HCPCS SELF ADMINISTERED DRUGS (ALT 637 FOR MEDICARE OP): Performed by: STUDENT IN AN ORGANIZED HEALTH CARE EDUCATION/TRAINING PROGRAM

## 2025-02-11 PROCEDURE — 87591 N.GONORRHOEAE DNA AMP PROB: CPT | Mod: ELYLAB | Performed by: STUDENT IN AN ORGANIZED HEALTH CARE EDUCATION/TRAINING PROGRAM

## 2025-02-11 PROCEDURE — 99283 EMERGENCY DEPT VISIT LOW MDM: CPT | Performed by: STUDENT IN AN ORGANIZED HEALTH CARE EDUCATION/TRAINING PROGRAM

## 2025-02-11 PROCEDURE — 81001 URINALYSIS AUTO W/SCOPE: CPT | Performed by: STUDENT IN AN ORGANIZED HEALTH CARE EDUCATION/TRAINING PROGRAM

## 2025-02-11 PROCEDURE — 87529 HSV DNA AMP PROBE: CPT | Mod: ELYLAB | Performed by: STUDENT IN AN ORGANIZED HEALTH CARE EDUCATION/TRAINING PROGRAM

## 2025-02-11 PROCEDURE — 81025 URINE PREGNANCY TEST: CPT | Performed by: STUDENT IN AN ORGANIZED HEALTH CARE EDUCATION/TRAINING PROGRAM

## 2025-02-11 RX ORDER — DOXYCYCLINE 100 MG/1
100 TABLET ORAL 2 TIMES DAILY
Qty: 20 TABLET | Refills: 0 | Status: SHIPPED | OUTPATIENT
Start: 2025-02-12 | End: 2025-02-22

## 2025-02-11 RX ORDER — VALACYCLOVIR HYDROCHLORIDE 1 G/1
1000 TABLET, FILM COATED ORAL 2 TIMES DAILY
Qty: 20 TABLET | Refills: 0 | Status: SHIPPED | OUTPATIENT
Start: 2025-02-11 | End: 2025-02-21

## 2025-02-11 RX ORDER — OXYCODONE HYDROCHLORIDE 5 MG/1
5 TABLET ORAL ONCE
Status: COMPLETED | OUTPATIENT
Start: 2025-02-11 | End: 2025-02-11

## 2025-02-11 RX ORDER — TRAMADOL HYDROCHLORIDE 50 MG/1
50 TABLET ORAL EVERY 4 HOURS PRN
Qty: 18 TABLET | Refills: 0 | Status: SHIPPED | OUTPATIENT
Start: 2025-02-11 | End: 2025-02-14

## 2025-02-11 RX ADMIN — OXYCODONE 5 MG: 5 TABLET ORAL at 20:23

## 2025-02-11 ASSESSMENT — LIFESTYLE VARIABLES
EVER HAD A DRINK FIRST THING IN THE MORNING TO STEADY YOUR NERVES TO GET RID OF A HANGOVER: NO
TOTAL SCORE: 0
HAVE YOU EVER FELT YOU SHOULD CUT DOWN ON YOUR DRINKING: NO
HAVE PEOPLE ANNOYED YOU BY CRITICIZING YOUR DRINKING: NO
EVER FELT BAD OR GUILTY ABOUT YOUR DRINKING: NO

## 2025-02-11 ASSESSMENT — COLUMBIA-SUICIDE SEVERITY RATING SCALE - C-SSRS
6. HAVE YOU EVER DONE ANYTHING, STARTED TO DO ANYTHING, OR PREPARED TO DO ANYTHING TO END YOUR LIFE?: NO
2. HAVE YOU ACTUALLY HAD ANY THOUGHTS OF KILLING YOURSELF?: NO
1. IN THE PAST MONTH, HAVE YOU WISHED YOU WERE DEAD OR WISHED YOU COULD GO TO SLEEP AND NOT WAKE UP?: NO

## 2025-02-11 ASSESSMENT — PAIN SCALES - GENERAL
PAINLEVEL_OUTOF10: 10 - WORST POSSIBLE PAIN
PAINLEVEL_OUTOF10: 0 - NO PAIN

## 2025-02-11 ASSESSMENT — PAIN - FUNCTIONAL ASSESSMENT: PAIN_FUNCTIONAL_ASSESSMENT: 0-10

## 2025-02-12 LAB
BACTERIA UR CULT: NORMAL
C TRACH RRNA SPEC QL NAA+PROBE: NEGATIVE
CLUE CELLS VAG LPF-#/AREA: ABNORMAL /[LPF]
HOLD SPECIMEN: NORMAL
N GONORRHOEA DNA SPEC QL PROBE+SIG AMP: NEGATIVE
NUGENT SCORE: 2
YEAST VAG WET PREP-#/AREA: PRESENT

## 2025-02-12 NOTE — ED PROVIDER NOTES
HPI   Chief Complaint   Patient presents with    Vaginal Discharge       Patient is a 19-year-old female presenting to the emergency department for complaints of vaginal pain and swelling.  Patient states she has not been sexually active for at least 2 weeks.  She denies any pregnancy as she does have an IUD and recently came off of her menstrual period.  Patient states that she was seen at [facility] Friday and tested for STDs and given a dose of Toradol and states that she was told everything was negative and discharged home but she states she is still having pain and swelling which is why she presented to the emergency department today.  She denies any fevers, chills, nausea, vomiting, diarrhea.      History provided by:  Patient and medical records          Patient History   Past Medical History:   Diagnosis Date    Left lower quadrant pain 05/31/2019    Abdominal pain, LLQ    Other specified health status     No pertinent past medical history    Personal history of other diseases of the digestive system 08/02/2019    History of bloody stools    Personal history of other diseases of the musculoskeletal system and connective tissue 05/28/2019    History of low back pain    Personal history of other specified conditions 04/12/2019    History of dysuria    Personal history of urinary (tract) infections 06/25/2019    History of urinary tract infection    Right lower quadrant abdominal tenderness 06/25/2019    Right lower quadrant abdominal tenderness without rebound tenderness     Past Surgical History:   Procedure Laterality Date    OTHER SURGICAL HISTORY  04/12/2019    Strabismus surgery     No family history on file.  Social History     Tobacco Use    Smoking status: Never    Smokeless tobacco: Never   Vaping Use    Vaping status: Never Used   Substance Use Topics    Alcohol use: Never    Drug use: Never       Physical Exam   ED Triage Vitals [02/11/25 1906]   Temperature Heart Rate Respirations BP   36.3 °C  (97.3 °F) 91 16 110/71      Pulse Ox Temp Source Heart Rate Source Patient Position   99 % Temporal Monitor Sitting      BP Location FiO2 (%)     Right arm --       Physical Exam  Vitals and nursing note reviewed. Exam conducted with a chaperone present.   Constitutional:       General: She is not in acute distress.     Appearance: Normal appearance. She is not ill-appearing, toxic-appearing or diaphoretic.   HENT:      Head: Normocephalic and atraumatic.      Nose: Nose normal.      Mouth/Throat:      Mouth: Mucous membranes are moist.      Pharynx: No oropharyngeal exudate or posterior oropharyngeal erythema.   Eyes:      General: No scleral icterus.     Extraocular Movements: Extraocular movements intact.      Pupils: Pupils are equal, round, and reactive to light.   Cardiovascular:      Rate and Rhythm: Normal rate and regular rhythm.      Pulses: Normal pulses.      Heart sounds: Normal heart sounds. No murmur heard.     No friction rub. No gallop.   Pulmonary:      Effort: Pulmonary effort is normal. No respiratory distress.      Breath sounds: Normal breath sounds. No stridor. No wheezing, rhonchi or rales.   Chest:      Chest wall: No tenderness.   Abdominal:      General: Abdomen is flat. There is no distension.      Palpations: Abdomen is soft. There is no mass.      Tenderness: There is no abdominal tenderness. There is no guarding.      Hernia: No hernia is present.   Genitourinary:     Labia:         Right: Tenderness and lesion present.         Left: Tenderness and lesion present.        Musculoskeletal:         General: No swelling, tenderness, deformity or signs of injury. Normal range of motion.      Cervical back: Normal range of motion and neck supple. No rigidity.   Skin:     General: Skin is warm and dry.      Capillary Refill: Capillary refill takes less than 2 seconds.      Coloration: Skin is not jaundiced or pale.      Findings: No bruising, erythema, lesion or rash.   Neurological:       General: No focal deficit present.      Mental Status: She is alert and oriented to person, place, and time. Mental status is at baseline.   Psychiatric:         Mood and Affect: Mood normal.         Behavior: Behavior normal.           ED Course & MDM   Diagnoses as of 02/11/25 2202   HSV infection   Cellulitis of other specified site                 No data recorded                                 Medical Decision Making  Patient is a 19-year-old female presenting to the emergency department for complaints of vaginal pain and swelling.  Urinalysis, chlamydia, gonorrhea, Gram stain for bacterial vaginosis and yeast were ordered.  Patient recently had a visit to [facility] on the seventh and had negative testing for chlamydia, gonorrhea, trichomonas, yeast, clue cells.  With nursing chaperone present I did do a vaginal exam which does show ulcerative lesions to the inferior portion of the labia majora on the left and right with some redness and swelling.  This is most concerning for HSV and sample was obtained and sent for testing.  Patient was advised of her findings and she was prescribed Valacyclovir as well as doxycycline to treat for viral as well as bacterial infection.  Patient states that she has an OB/GYN that she follows with and she was advised to follow-up with them.  Return precautions were given.  All questions were answered.  Patient was appreciative care and agreeable to discharge.    Amount and/or Complexity of Data Reviewed  Labs: ordered. Decision-making details documented in ED Course.      Labs Reviewed   URINALYSIS WITH REFLEX CULTURE AND MICROSCOPIC - Abnormal       Result Value    Color, Urine Dark-Yellow      Appearance, Urine Turbid (*)     Specific Gravity, Urine 1.022      pH, Urine 6.5      Protein, Urine 10 (TRACE)      Glucose, Urine Normal      Blood, Urine 1.0 (3+) (*)     Ketones, Urine NEGATIVE      Bilirubin, Urine NEGATIVE      Urobilinogen, Urine 3 (1+) (*)     Nitrite,  Urine NEGATIVE      Leukocyte Esterase, Urine 500 Mariangel/uL (*)    MICROSCOPIC ONLY, URINE - Abnormal    WBC, Urine >50 (*)     RBC, Urine >20 (*)     Squamous Epithelial Cells, Urine 10-25 (FEW)      Mucus, Urine FEW     HCG, URINE, QUALITATIVE - Normal    HCG, Urine NEGATIVE     GRAM STAIN FOR BACTERIAL VAGINOSIS/YEAST   HSV PCR, SKIN/MUCOSA   URINE CULTURE   URINALYSIS WITH REFLEX CULTURE AND MICROSCOPIC    Narrative:     The following orders were created for panel order Urinalysis with Reflex Culture and Microscopic.  Procedure                               Abnormality         Status                     ---------                               -----------         ------                     Urinalysis with Reflex C...[[id number]]  Abnormal            Final result               Extra Urine Gray Tube[[id number]]                            In process                   Please view results for these tests on the individual orders.   C. TRACHOMATIS / N. GONORRHOEAE, AMPLIFIED, UROGENITAL   EXTRA URINE GRAY TUBE     No orders to display         Procedure  Procedures     Srikanth Bowers DO  02/11/25 8355

## 2025-02-13 ENCOUNTER — TELEPHONE (OUTPATIENT)
Dept: PHARMACY | Facility: HOSPITAL | Age: 20
End: 2025-02-13
Payer: COMMERCIAL

## 2025-02-13 DIAGNOSIS — B37.31 VAGINAL YEAST INFECTION: Primary | ICD-10-CM

## 2025-02-13 LAB
HSV1 DNA SKIN QL NAA+PROBE: DETECTED
HSV2 DNA SKIN QL NAA+PROBE: NOT DETECTED

## 2025-02-13 RX ORDER — FLUCONAZOLE 150 MG/1
150 TABLET ORAL ONCE
Qty: 1 TABLET | Refills: 0 | Status: SHIPPED | OUTPATIENT
Start: 2025-02-13 | End: 2025-02-13

## 2025-02-13 NOTE — PROGRESS NOTES
EDPD Note: Initiation     Contacted Juan José Santoro regarding a positive yeast culture/result that was taken during their recent emergency room visit. I completed education with patient. The patient is not being treated appropriately.    Patient presented to the ED for complaints of vaginal discharge, pain, & swelling. Physical exam revealed ulcerative lesions to the inferior portion of the labia majora. She was discharged on doxycyline 100mg BID x 10D and Valtrex 1g BID x 10D for viral vs bacterial cause (noted as cellulitis on prescription) of vaginal lesions. At this time, HSV culture has not yet resulted. Patient reports that she is still having some vaginal discharge and discomfort. Counseled on initiation of fluconazole for coverage of yeast infection. Recommend continue doxycyline and valtrex as prescribed.    The following prescription was sent to the patient's preferred pharmacy. No further follow up needed from EDPD Team.     Drug: Fluconazole 150mg  Si time dose  Qty: 1  Days Supply: 1    No results found for the last 90 days.      If there are any other questions for the ED Post-Discharge Culture Follow Up Team, please contact [phone]. Fax: [phone].    Madelin Lora, JetD

## 2025-02-14 ENCOUNTER — TELEPHONE (OUTPATIENT)
Dept: PHARMACY | Facility: HOSPITAL | Age: 20
End: 2025-02-14
Payer: COMMERCIAL

## 2025-02-14 NOTE — PROGRESS NOTES
EDPD Note: Antibiotics Reviewed and Warranted    Contacted Mr./Mrs./Ms. Juan José Santoro regarding a positive HSV 1 culture/result that was taken during their recent emergency room visit. I completed education with patient . The patient is being treated appropriately with Valtrex 1 g bid x 10 days.     No results found for the last 90 days.   HSV PCR, Skin/Mucosa  Order: [id number]   Collected 2/11/2025 21:24       Status: Final result       Visible to patient: No (inaccessible in [facility])    Specimen Information: Vaginal; Swab   1 Result Note       1 Follow-up Encounter      Component  Ref Range & Units    Herpes simplex virus 1 PCR, Skin/Mucosa  Not Detected Detected Abnormal    Herpes simplex virus 2 PCR, Skin/Mucosa  Not Detected Not Detected   Resulting Agency [facility]              Narrative  Performed by: [facility]  This assay is an FDA-cleared in vitro diagnostic nucleic acid amplification test for the qualitative detection and differentiation of Herpes Simplex Virus (HSV) 1 & 2 from cutaneous and mucocutaneous lesion samples in symptomatic patients. Negative results do not preclude HSV 1 & 2 infections and should not be used as the sole basis for diagnosis, treatment, or other management decisions.   Specimen Collected: 02/11/25 21:24 Last Resulted: 02/13/25 16:50       Order Details          View Encounter          Lab and Collection Details          Routing          Result History       View All Conversations on this Encounter     Result Care Coordination      No further follow up needed from EDPD Team.     Gautam Salinas, JetD

## 2025-07-30 VITALS
RESPIRATION RATE: 18 BRPM | HEART RATE: 74 BPM | OXYGEN SATURATION: 99 % | WEIGHT: 166 LBS | DIASTOLIC BLOOD PRESSURE: 81 MMHG | SYSTOLIC BLOOD PRESSURE: 119 MMHG | HEIGHT: 66 IN | BODY MASS INDEX: 26.68 KG/M2 | TEMPERATURE: 98.2 F

## 2025-07-30 PROCEDURE — 99284 EMERGENCY DEPT VISIT MOD MDM: CPT

## 2025-07-31 ENCOUNTER — HOSPITAL ENCOUNTER (EMERGENCY)
Age: 20
Discharge: HOME | End: 2025-07-31
Payer: COMMERCIAL

## 2025-07-31 DIAGNOSIS — N94.6 DYSMENORRHEA: Primary | ICD-10-CM

## 2025-07-31 DIAGNOSIS — N30.00 ACUTE CYSTITIS WITHOUT HEMATURIA: ICD-10-CM

## 2025-07-31 LAB
ALBUMIN SERPL-MCNC: 4.4 G/DL (ref 3.5–4.6)
ALP SERPL-CCNC: 58 U/L (ref 40–130)
ALT SERPL-CCNC: 15 U/L (ref 0–33)
ANION GAP SERPL CALCULATED.3IONS-SCNC: 9 MEQ/L (ref 9–15)
AST SERPL-CCNC: 18 U/L (ref 0–35)
BACTERIA URNS QL MICRO: ABNORMAL /HPF
BASOPHILS # BLD: 0 K/UL (ref 0–0.2)
BASOPHILS NFR BLD: 0.7 %
BILIRUB SERPL-MCNC: 0.5 MG/DL (ref 0.2–0.7)
BILIRUB UR QL STRIP: NEGATIVE
BUN SERPL-MCNC: 10 MG/DL (ref 6–20)
CALCIUM SERPL-MCNC: 9.3 MG/DL (ref 8.5–9.9)
CHLORIDE SERPL-SCNC: 105 MEQ/L (ref 95–107)
CLARITY UR: ABNORMAL
CLUE CELLS VAG QL WET PREP: NORMAL
CO2 SERPL-SCNC: 23 MEQ/L (ref 20–31)
COLOR UR: YELLOW
CREAT SERPL-MCNC: 0.79 MG/DL (ref 0.5–0.9)
EOSINOPHIL # BLD: 0.1 K/UL (ref 0–0.7)
EOSINOPHIL NFR BLD: 1.2 %
EPI CELLS #/AREA URNS AUTO: ABNORMAL /HPF (ref 0–5)
ERYTHROCYTE [DISTWIDTH] IN BLOOD BY AUTOMATED COUNT: 12.9 % (ref 11.5–14.5)
GLOBULIN SER CALC-MCNC: 2.8 G/DL (ref 2.3–3.5)
GLUCOSE SERPL-MCNC: 83 MG/DL (ref 70–99)
GLUCOSE UR STRIP-MCNC: NEGATIVE MG/DL
HCG, URINE, POC: NEGATIVE
HCT VFR BLD AUTO: 43.6 % (ref 37–47)
HGB BLD-MCNC: 14.4 G/DL (ref 12–16)
HGB UR QL STRIP: ABNORMAL
HYALINE CASTS #/AREA URNS AUTO: ABNORMAL /HPF (ref 0–5)
KETONES UR STRIP-MCNC: NEGATIVE MG/DL
LEUKOCYTE ESTERASE UR QL STRIP: ABNORMAL
LIPASE SERPL-CCNC: 44 U/L (ref 12–95)
LYMPHOCYTES # BLD: 1.7 K/UL (ref 1–4.8)
LYMPHOCYTES NFR BLD: 28.8 %
Lab: NORMAL
MCH RBC QN AUTO: 32.6 PG (ref 27–31.3)
MCHC RBC AUTO-ENTMCNC: 33 % (ref 33–37)
MCV RBC AUTO: 98.6 FL (ref 79.4–94.8)
MONOCYTES # BLD: 0.7 K/UL (ref 0.2–0.8)
MONOCYTES NFR BLD: 11.5 %
NEGATIVE QC PASS/FAIL: NORMAL
NEUTROPHILS # BLD: 3.3 K/UL (ref 1.4–6.5)
NEUTS SEG NFR BLD: 57.3 %
NITRITE UR QL STRIP: POSITIVE
PH UR STRIP: 8 [PH] (ref 5–9)
PLATELET # BLD AUTO: 313 K/UL (ref 130–400)
POSITIVE QC PASS/FAIL: NORMAL
POTASSIUM SERPL-SCNC: 4.1 MEQ/L (ref 3.4–4.9)
PROT SERPL-MCNC: 7.2 G/DL (ref 6.3–8)
PROT UR STRIP-MCNC: ABNORMAL MG/DL
RBC # BLD AUTO: 4.42 M/UL (ref 4.2–5.4)
RBC #/AREA URNS HPF: ABNORMAL /HPF (ref 0–2)
SODIUM SERPL-SCNC: 137 MEQ/L (ref 135–144)
SP GR UR STRIP: 1.02 (ref 1–1.03)
T VAGINALIS VAG QL WET PREP: NORMAL
TRICHOMONAS VAGINALIS SCREEN: NEGATIVE
URINE REFLEX TO CULTURE: YES
UROBILINOGEN UR STRIP-ACNC: 1 E.U./DL
WBC # BLD AUTO: 5.8 K/UL (ref 4.8–10.8)
WBC #/AREA URNS AUTO: >100 /HPF (ref 0–5)
YEAST VAG QL WET PREP: NORMAL

## 2025-07-31 PROCEDURE — 6370000000 HC RX 637 (ALT 250 FOR IP): Performed by: PHYSICIAN ASSISTANT

## 2025-07-31 PROCEDURE — 36415 COLL VENOUS BLD VENIPUNCTURE: CPT

## 2025-07-31 PROCEDURE — 81001 URINALYSIS AUTO W/SCOPE: CPT

## 2025-07-31 PROCEDURE — 80053 COMPREHEN METABOLIC PANEL: CPT

## 2025-07-31 PROCEDURE — 87210 SMEAR WET MOUNT SALINE/INK: CPT

## 2025-07-31 PROCEDURE — 96375 TX/PRO/DX INJ NEW DRUG ADDON: CPT

## 2025-07-31 PROCEDURE — 87086 URINE CULTURE/COLONY COUNT: CPT

## 2025-07-31 PROCEDURE — 96374 THER/PROPH/DIAG INJ IV PUSH: CPT

## 2025-07-31 PROCEDURE — 87088 URINE BACTERIA CULTURE: CPT

## 2025-07-31 PROCEDURE — 85025 COMPLETE CBC W/AUTO DIFF WBC: CPT

## 2025-07-31 PROCEDURE — 6360000002 HC RX W HCPCS: Performed by: PHYSICIAN ASSISTANT

## 2025-07-31 PROCEDURE — 83690 ASSAY OF LIPASE: CPT

## 2025-07-31 PROCEDURE — 87186 SC STD MICRODIL/AGAR DIL: CPT

## 2025-07-31 PROCEDURE — 87491 CHLMYD TRACH DNA AMP PROBE: CPT

## 2025-07-31 PROCEDURE — 87808 TRICHOMONAS ASSAY W/OPTIC: CPT

## 2025-07-31 PROCEDURE — 87591 N.GONORRHOEAE DNA AMP PROB: CPT

## 2025-07-31 PROCEDURE — 2500000003 HC RX 250 WO HCPCS: Performed by: PHYSICIAN ASSISTANT

## 2025-07-31 RX ORDER — KETOROLAC TROMETHAMINE 15 MG/ML
15 INJECTION, SOLUTION INTRAMUSCULAR; INTRAVENOUS ONCE
Status: COMPLETED | OUTPATIENT
Start: 2025-07-31 | End: 2025-07-31

## 2025-07-31 RX ORDER — DIPHENHYDRAMINE HYDROCHLORIDE 50 MG/ML
25 INJECTION, SOLUTION INTRAMUSCULAR; INTRAVENOUS ONCE
Status: COMPLETED | OUTPATIENT
Start: 2025-07-31 | End: 2025-07-31

## 2025-07-31 RX ORDER — PROCHLORPERAZINE EDISYLATE 5 MG/ML
10 INJECTION INTRAMUSCULAR; INTRAVENOUS ONCE
Status: COMPLETED | OUTPATIENT
Start: 2025-07-31 | End: 2025-07-31

## 2025-07-31 RX ORDER — SULFAMETHOXAZOLE AND TRIMETHOPRIM 800; 160 MG/1; MG/1
1 TABLET ORAL 2 TIMES DAILY
Qty: 14 TABLET | Refills: 0 | Status: SHIPPED | OUTPATIENT
Start: 2025-07-31 | End: 2025-08-07

## 2025-07-31 RX ORDER — NAPROXEN 500 MG/1
500 TABLET ORAL 2 TIMES DAILY PRN
Qty: 30 TABLET | Refills: 0 | Status: SHIPPED | OUTPATIENT
Start: 2025-07-31

## 2025-07-31 RX ORDER — DOXYCYCLINE 100 MG/1
100 CAPSULE ORAL ONCE
Status: COMPLETED | OUTPATIENT
Start: 2025-07-31 | End: 2025-07-31

## 2025-07-31 RX ADMIN — KETOROLAC TROMETHAMINE 15 MG: 15 INJECTION, SOLUTION INTRAMUSCULAR; INTRAVENOUS at 00:45

## 2025-07-31 RX ADMIN — DIPHENHYDRAMINE HYDROCHLORIDE 25 MG: 50 INJECTION INTRAMUSCULAR; INTRAVENOUS at 00:45

## 2025-07-31 RX ADMIN — PROCHLORPERAZINE EDISYLATE 10 MG: 5 INJECTION INTRAMUSCULAR; INTRAVENOUS at 00:45

## 2025-07-31 RX ADMIN — DOXYCYCLINE HYCLATE 100 MG: 100 CAPSULE ORAL at 02:53

## 2025-07-31 RX ADMIN — WATER 1000 MG: 1 INJECTION INTRAMUSCULAR; INTRAVENOUS; SUBCUTANEOUS at 02:54

## 2025-07-31 ASSESSMENT — PAIN SCALES - GENERAL: PAINLEVEL_OUTOF10: 9

## 2025-07-31 NOTE — ED PROVIDER NOTES
MEDICAL SCREENING EXAM     Basic Information   Time Seen: 10:52 PM   Primary Care Provider: Gabino Ulloa MD     Chief Complaint   Patient presents with    Migraine    Abdominal Pain      HPI   Danilo Mcghee is a 19 yrs female who presents with with a 2 to 3-day history of worsening cramping to the lower abdomen with radiation of pain into the back, dysuria and headaches   Physical Exam     /81 (07/30/25 2238)    Temp 98.2 °F (36.8 °C) (07/30/25 2238)    Pulse 74 (07/30/25 2238)   Resp 18 (07/30/25 2238)    SpO2 99 % (07/30/25 2238)       General: Awake and Alert, no acute distress   CV: RRR, S1, S2   Resp: LCTAB, even and non labored   Other:   Impression and Plan     Labs Reviewed   CBC WITH AUTO DIFFERENTIAL   COMPREHENSIVE METABOLIC PANEL   LIPASE   URINALYSIS WITH REFLEX TO CULTURE   POC PREGNANCY UR-QUAL        No orders to display      Final Impression   I have performed a medical screening exam on Danilo Mcghee. Based on this patient's chief complaint/symptoms of   Chief Complaint   Patient presents with    Migraine    Abdominal Pain    and my focused exam, their care will be started and transitioned to provider when room is available        Carlos Clarke PA-C  07/30/25 1341

## 2025-07-31 NOTE — ED TRIAGE NOTES
Patient states that she started her menstrual cycle this morning and having really bad cramps and she also states she has had a migraine for a couple of days. Patient states she is worried that her iron level is too low and that is the problem

## 2025-07-31 NOTE — ED PROVIDER NOTES
[facility] EMERGENCY DEPARTMENT  eMERGENCYdEPARTMENT eNCOUnter        Pt Name: Danilo Mcghee  MRN: [id number]  Birthdate 2005of evaluation: 2025  Provider:Phani Dacosta PA-C  12:30 AM EDT    CHIEF COMPLAINT       Chief Complaint   Patient presents with    Migraine    Abdominal Pain         HISTORY OF PRESENT ILLNESS  (Location/Symptom, Timing/Onset, Context/Setting, Quality, Duration, Modifying Factors, Severity.)   Danilo Mcghee is a 19 y.o. female who presents to the emergency department      Patient presents emergency department with menstrual cramping and headache.  This been ongoing since today.  Denies any excessive bleeding.  She does get monthly.  She is currently due for her period.  Denies any significant history of dysmenorrhea.  She is currently on contraceptive medication.  She reports the headache is noted to the left parietal region.  She does have a history of similar headaches in the past states they have been more frequent.  She does not follow with a family physician.  She is also request to be tested for sexually transmitted disease.          Nursing Notes were reviewed and I agree.    REVIEW OF SYSTEMS    (2-9 systems for level 4, 10 or more for level 5)     Review of Systems   All other systems reviewed and are negative.       as noted above the remainder of the review of systems was reviewed and negative.       PAST MEDICAL HISTORY   History reviewed. No pertinent past medical history.      SURGICAL HISTORY       Past Surgical History:   Procedure Laterality Date     SECTION      FOOT SURGERY      FOOT SURGERY       cyst removal         CURRENT MEDICATIONS       Discharge Medication List as of 2025  2:53 AM        CONTINUE these medications which have NOT CHANGED    Details   etodolac (LODINE) 400 MG tablet Take 1 tablet by mouth 2 times daily, Disp-14 tablet, R-0Normal      diclofenac sodium (VOLTAREN) 1 % GEL Apply 4 g topically 4 times daily, Topical, 4

## 2025-07-31 NOTE — DISCHARGE INSTRUCTIONS
Recommend ibuprofen or the prescribed naproxen as needed for pain.  You were placed on antibiotic for urinary tract infection.  Your gonorrhea and Chlamydia testing may take several days to result we did give the Rocephin today which would cover you for gonorrhea given your urinary tract infection we will hold off on also placing you on a medication for chlamydia unless this results positive.

## 2025-08-01 LAB
BACTERIA UR CULT: ABNORMAL
BACTERIA UR CULT: ABNORMAL
ORGANISM: ABNORMAL

## 2025-08-04 RX ORDER — CEPHALEXIN 500 MG/1
500 CAPSULE ORAL 3 TIMES DAILY
Qty: 15 CAPSULE | Refills: 0 | Status: SHIPPED | OUTPATIENT
Start: 2025-08-04 | End: 2025-08-09

## 2025-08-05 LAB
C TRACH DNA UR QL NAA+PROBE: NEGATIVE
N GONORRHOEA DNA UR QL NAA+PROBE: NEGATIVE

## 2025-08-11 ENCOUNTER — HOSPITAL ENCOUNTER (EMERGENCY)
Age: 20
Discharge: HOME | End: 2025-08-11
Attending: EMERGENCY MEDICINE
Payer: COMMERCIAL

## 2025-08-11 ENCOUNTER — APPOINTMENT (OUTPATIENT)
Dept: GENERAL RADIOLOGY | Age: 20
End: 2025-08-11
Payer: COMMERCIAL

## 2025-08-11 VITALS
HEART RATE: 100 BPM | TEMPERATURE: 97.6 F | SYSTOLIC BLOOD PRESSURE: 127 MMHG | RESPIRATION RATE: 18 BRPM | DIASTOLIC BLOOD PRESSURE: 86 MMHG | OXYGEN SATURATION: 98 %

## 2025-08-11 DIAGNOSIS — S93.401A SPRAIN OF RIGHT ANKLE, UNSPECIFIED LIGAMENT, INITIAL ENCOUNTER: Primary | ICD-10-CM

## 2025-08-11 PROCEDURE — 99283 EMERGENCY DEPT VISIT LOW MDM: CPT

## 2025-08-11 PROCEDURE — 73610 X-RAY EXAM OF ANKLE: CPT

## 2025-08-11 RX ORDER — IBUPROFEN 800 MG/1
800 TABLET, FILM COATED ORAL EVERY 8 HOURS PRN
Qty: 30 TABLET | Refills: 0 | Status: SHIPPED | OUTPATIENT
Start: 2025-08-11

## 2025-08-11 ASSESSMENT — ENCOUNTER SYMPTOMS
SINUS PAIN: 0
COUGH: 0
BACK PAIN: 0
VOMITING: 0
EYE PAIN: 0
ABDOMINAL PAIN: 0
EYE REDNESS: 0
SHORTNESS OF BREATH: 0
NAUSEA: 0

## 2025-08-11 ASSESSMENT — PAIN DESCRIPTION - ONSET: ONSET: ON-GOING

## 2025-08-11 ASSESSMENT — PAIN DESCRIPTION - PAIN TYPE: TYPE: ACUTE PAIN

## 2025-08-11 ASSESSMENT — PAIN DESCRIPTION - FREQUENCY: FREQUENCY: CONTINUOUS

## 2025-08-11 ASSESSMENT — PAIN - FUNCTIONAL ASSESSMENT
PAIN_FUNCTIONAL_ASSESSMENT: 0-10
PAIN_FUNCTIONAL_ASSESSMENT: PREVENTS OR INTERFERES WITH ALL ACTIVE AND SOME PASSIVE ACTIVITIES

## 2025-08-11 ASSESSMENT — PAIN DESCRIPTION - LOCATION: LOCATION: ANKLE

## 2025-08-11 ASSESSMENT — PAIN SCALES - GENERAL: PAINLEVEL_OUTOF10: 9

## 2025-08-11 ASSESSMENT — PAIN DESCRIPTION - DESCRIPTORS: DESCRIPTORS: THROBBING

## 2025-08-11 ASSESSMENT — PAIN DESCRIPTION - ORIENTATION: ORIENTATION: RIGHT
